# Patient Record
Sex: MALE | Race: WHITE | Employment: UNEMPLOYED | URBAN - METROPOLITAN AREA
[De-identification: names, ages, dates, MRNs, and addresses within clinical notes are randomized per-mention and may not be internally consistent; named-entity substitution may affect disease eponyms.]

---

## 2017-01-03 ENCOUNTER — OFFICE VISIT (OUTPATIENT)
Dept: FAMILY MEDICINE | Facility: CLINIC | Age: 47
End: 2017-01-03

## 2017-01-03 VITALS
BODY MASS INDEX: 30.1 KG/M2 | DIASTOLIC BLOOD PRESSURE: 84 MMHG | OXYGEN SATURATION: 98 % | HEART RATE: 95 BPM | WEIGHT: 215 LBS | TEMPERATURE: 97.9 F | RESPIRATION RATE: 12 BRPM | HEIGHT: 71 IN | SYSTOLIC BLOOD PRESSURE: 130 MMHG

## 2017-01-03 DIAGNOSIS — I10 ESSENTIAL HYPERTENSION, BENIGN: Primary | ICD-10-CM

## 2017-01-03 DIAGNOSIS — L84 CORN OR CALLUS: ICD-10-CM

## 2017-01-03 LAB
BUN SERPL-MCNC: 21 MG/DL (ref 5–24)
CALCIUM SERPL-MCNC: 9.7 MG/DL (ref 8.5–10.4)
CHLORIDE SERPLBLD-SCNC: 97 MMOL/L (ref 94–109)
CHOLEST SERPL-MCNC: 187 MG/DL
CHOLEST/HDLC SERPL: 4.5 RATIO
CO2 SERPL-SCNC: 29 MMOL/L (ref 20–32)
CREAT SERPL-MCNC: 0.9 MG/DL (ref 0.8–1.5)
EGFR CALCULATED (BLACK REFERENCE): 117.4 ML/MIN
EGFR CALCULATED (NON BLACK REFERENCE): 97 ML/MIN
GLUCOSE SERPL-MCNC: 87 MG/DL (ref 60–109)
HBA1C MFR BLD: 5.5 % (ref 4.1–5.7)
HDLC SERPL-MCNC: 41 MG/DL
LDLC SERPL CALC-MCNC: 98 MG/DL (ref 0–99)
POTASSIUM SERPL-SCNC: 4.3 MMOL/L (ref 3.4–5.3)
SODIUM SERPL-SCNC: 140 MMOL/L (ref 133–144)
TRIGL SERPL-MCNC: 241 MG/DL
VLDL-CHOLESTEROL: 48 MG/DL (ref 7–32)

## 2017-01-03 RX ORDER — HYDROCHLOROTHIAZIDE 25 MG/1
25 TABLET ORAL DAILY
Qty: 90 TABLET | Refills: 3 | Status: SHIPPED | OUTPATIENT
Start: 2017-01-03 | End: 2018-01-18

## 2017-01-03 RX ORDER — LISINOPRIL 5 MG/1
5 TABLET ORAL DAILY
Qty: 90 TABLET | Refills: 3 | Status: SHIPPED | OUTPATIENT
Start: 2017-01-03 | End: 2018-01-18

## 2017-01-03 NOTE — MR AVS SNAPSHOT
"              After Visit Summary   1/3/2017    Humberto Nova    MRN: [id number]           Patient Information     Date Of Birth          1970        Visit Information        Provider Department      1/3/2017 2:20 PM Daniel Sepulveda MD [facility]        Today's Diagnoses     Essential hypertension, benign    -  1     Corn or callus            Follow-ups after your visit        Who to contact     Please call your clinic at [phone] to:    Ask questions about your health    Make or cancel appointments    Discuss your medicines    Learn about your test results    Speak to your doctor   If you have compliments or concerns about an experience at your clinic, or if you wish to file a complaint, please contact [facility] Patient Relations at [phone] or email us at [email or web address]         Additional Information About Your Visit        MyChart Information     [employer] gives you secure access to your electronic health record. If you see a primary care provider, you can also send messages to your care team and make appointments. If you have questions, please call your primary care clinic.  If you do not have a primary care provider, please call [phone] and they will assist you.      My[employer] is an electronic gateway that provides easy, online access to your medical records. With [employer], you can request a clinic appointment, read your test results, renew a prescription or communicate with your care team.     To access your existing account, please contact your [facility] Clinic or call [phone] for assistance.        Care EveryWhere ID     This is your Care EveryWhere ID. This could be used by other organizations to access your [facility] medical records  [id number]        Your Vitals Were     Pulse Temperature Respirations Height BMI (Body Mass Index) Pulse Oximetry    95 97.9  F (36.6  C) (Oral) 12 5' 11.25\" (181 cm) " 29.77 kg/m2 98%       Blood Pressure from Last 3 Encounters:   01/03/17 130/84   11/25/14 111/70   09/29/14 135/86    Weight from Last 3 Encounters:   01/03/17 215 lb (97.523 kg)   11/25/14 214 lb 6.4 oz (97.251 kg)   09/29/14 209 lb 6.4 oz (94.983 kg)              We Performed the Following     Basic Metabolic Panel ([facility]) - Results < 1 hr     Hemoglobin A1c ([facility])     Lipid Panel ([facility]) - Results < 1 hr          Where to get your medicines      These medications were sent to [id number] - [place], [place] - [address] AT [facility] & [place] [address], [place] [address]     Phone:  [phone]    - hydrochlorothiazide 25 MG tablet  - lisinopril 5 MG tablet       Primary Care Provider Office Phone # Fax #    Daniel Sepulveda [phone]       [facility] [address] [facility] [address]        Thank you!     Thank you for choosing [facility]  for your care. Our goal is always to provide you with excellent care. Hearing back from our patients is one way we can continue to improve our services. Please take a few minutes to complete the written survey that you may receive in the mail after your visit with us. Thank you!             Your Updated Medication List - Protect others around you: Learn how to safely use, store and throw away your medicines at www.disposemymeds.org.          This list is accurate as of: 1/3/17  2:52 PM.  Always use your most recent med list.                   Brand Name Dispense Instructions for use    hydrochlorothiazide 25 MG tablet    HYDRODIURIL    90 tablet    Take 1 tablet (25 mg) by mouth daily       lisinopril 5 MG tablet    PRINIVIL/ZESTRIL    90 tablet    Take 1 tablet (5 mg) by mouth daily

## 2017-01-03 NOTE — PROGRESS NOTES
"       HPI:   Humberto Nova is a 46 year old male with PMH of HTN, remote drug abuse who presents with:    HTN f/u: He is on HCTZ and low dose Lisinoprol for several years. He has been stable on these meds for years and no longer checks his BP on a regular basis. He denies any side effects. Denies HA's, vision complaints, CP, SOB, LE edema, urinary problems. He has a strong family history of HTN with no MI's or CVA's.     Lenore: right lateral 5th digit with corn. It intermittently gets painful and is currently asymptomatic. He wears heavy work boots and this sometimes worsens his problem. No bleeding. No other toe or foot problems including bleeding or open sores.          Review of Systems:   10 point ROS negative except as mentioned above              PMHX:     Patient Active Problem List   Diagnosis     Benign essential hypertension     Health Care Home       Current Outpatient Prescriptions   Medication Sig Dispense Refill     hydrochlorothiazide (HYDRODIURIL) 25 MG tablet Take 1 tablet (25 mg) by mouth daily 90 tablet 3     lisinopril (PRINIVIL/ZESTRIL) 5 MG tablet Take 1 tablet (5 mg) by mouth daily 90 tablet 3       Social History   Substance Use Topics     Smoking status: Former Smoker -- 1.00 packs/day for 18 years     Types: Cigarettes     Smokeless tobacco: Not on file      Comment: roughly quit about 2007-     Alcohol Use: No      Comment: previous etoh abuse      SH: No drugs or alcohol for > 10 years.  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       No Known Allergies    Past medical, surgical, and family history reviewed and updated in Epic \"History\" tab and/or problem list.       Physical Exam:     Filed Vitals:    01/03/17 1425   BP: 130/84   Pulse: 95   Temp: 97.9  F (36.6  C)   TempSrc: Oral   Resp: 12   Height: 5' 11.25\" (181 cm)   Weight: 215 lb (97.523 kg)   SpO2: 98%     Body mass index is 29.77 kg/(m^2).  VS reviewed    GENERAL APPEARANCE: healthy, alert and no distress,  EYES: Eyes " grossly normal to inspection,  PERRL  NECK: no adenopathy, no asymmetry, masses, or scars and thyroid normal to palpation  RESP: lungs clear to auscultation - no rales, rhonchi or wheezes  CV: regular rate and rhythm,  and no murmur, click,  rub or gallop  ABDOMEN: soft, nontender, without hepatosplenomegaly or masses  MS: extremities normal- no gross deformities noted. Right lateral 5th toe with mild corn with no open wounds.   SKIN: no suspicious lesions or rashes  NEURO: Normal strength and tone, sensory exam grossly normal, mentation appears intact and speech normal  PSYCH: mood and affect normal/bright    Assessment and Plan     1. Essential hypertension, benign: BP well controlled on current regimen. We discussed possibly simplifying regimen to one pill, however, he would like to continue what is working. Lab eval below was unremarkable. Low ASCVD risk. Recommended weight loss, active lifestyle, and at least yearly f/u  - hydrochlorothiazide (HYDRODIURIL) 25 MG tablet; Take 1 tablet (25 mg) by mouth daily  Dispense: 90 tablet; Refill: 3  - lisinopril (PRINIVIL/ZESTRIL) 5 MG tablet; Take 1 tablet (5 mg) by mouth daily  Dispense: 90 tablet; Refill: 3  - Basic Metabolic Panel ([facility]) - Results < 1 hr  - Lipid Panel ([facility]) - Results < 1 hr  - Hemoglobin A1c ([facility])    2. Corn or callus: Stable and mild currently. We discussed shaving down the callous if he desired which he doesn't today. He will use a pumice stone to grind it down. He will f/u if it gets worse.       Options for treatment and follow-up care were reviewed with the patient and/or guardian. Humberto Nova and/or guardian engaged in the decision making process and verbalized understanding of the options discussed and agreed with the final plan.    Don Sepulveda MD  [facility] Medicine Resident  Pager # [phone]    Precepted with: Patrick Stevens MD

## 2017-01-10 NOTE — PROGRESS NOTES
Preceptor Attestation:  Patient's case reviewed and discussed with Daniel Sepulveda MD resident and I evaluated the patient. I agree with written assessment and plan of care.  Supervising Physician:  Patrick Stevens MD  [facility]

## 2018-01-18 DIAGNOSIS — I10 ESSENTIAL HYPERTENSION, BENIGN: ICD-10-CM

## 2018-01-18 RX ORDER — LISINOPRIL 5 MG/1
5 TABLET ORAL DAILY
Qty: 90 TABLET | Refills: 3 | Status: SHIPPED | OUTPATIENT
Start: 2018-01-18 | End: 2019-02-01

## 2018-01-18 RX ORDER — HYDROCHLOROTHIAZIDE 25 MG/1
25 TABLET ORAL DAILY
Qty: 90 TABLET | Refills: 3 | Status: SHIPPED | OUTPATIENT
Start: 2018-01-18 | End: 2019-02-01

## 2019-01-31 DIAGNOSIS — I10 ESSENTIAL HYPERTENSION, BENIGN: ICD-10-CM

## 2019-01-31 RX ORDER — LISINOPRIL 5 MG/1
5 TABLET ORAL DAILY
Qty: 90 TABLET | OUTPATIENT
Start: 2019-01-31

## 2019-01-31 RX ORDER — HYDROCHLOROTHIAZIDE 25 MG/1
25 TABLET ORAL DAILY
Qty: 90 TABLET | OUTPATIENT
Start: 2019-01-31

## 2019-02-01 ENCOUNTER — OFFICE VISIT (OUTPATIENT)
Dept: FAMILY MEDICINE | Facility: CLINIC | Age: 49
End: 2019-02-01
Payer: COMMERCIAL

## 2019-02-01 VITALS
WEIGHT: 215 LBS | DIASTOLIC BLOOD PRESSURE: 84 MMHG | HEIGHT: 72 IN | RESPIRATION RATE: 20 BRPM | HEART RATE: 86 BPM | BODY MASS INDEX: 29.12 KG/M2 | OXYGEN SATURATION: 98 % | SYSTOLIC BLOOD PRESSURE: 122 MMHG

## 2019-02-01 DIAGNOSIS — E66.3 OVERWEIGHT (BMI 25.0-29.9): ICD-10-CM

## 2019-02-01 DIAGNOSIS — I10 BENIGN ESSENTIAL HYPERTENSION: ICD-10-CM

## 2019-02-01 DIAGNOSIS — Z00.01 ENCOUNTER FOR ROUTINE ADULT MEDICAL EXAM WITH ABNORMAL FINDINGS: Primary | ICD-10-CM

## 2019-02-01 LAB
BUN SERPL-MCNC: 17 MG/DL (ref 5–24)
CALCIUM SERPL-MCNC: 10.2 MG/DL (ref 8.5–10.4)
CHLORIDE SERPLBLD-SCNC: 101 MMOL/L (ref 94–109)
CO2 SERPL-SCNC: 31 MMOL/L (ref 20–32)
CREAT SERPL-MCNC: 1 MG/DL (ref 0.8–1.5)
EGFR CALCULATED (BLACK REFERENCE): >90 ML/MIN
EGFR CALCULATED (NON BLACK REFERENCE): 85.1 ML/MIN
GLUCOSE SERPL-MCNC: 99 MG/DL (ref 60–109)
POTASSIUM SERPL-SCNC: 4.6 MMOL/L (ref 3.4–5.3)
SODIUM SERPL-SCNC: 147 MMOL/L (ref 133–144)

## 2019-02-01 RX ORDER — HYDROCHLOROTHIAZIDE 25 MG/1
25 TABLET ORAL DAILY
Qty: 90 TABLET | Refills: 3 | Status: SHIPPED | OUTPATIENT
Start: 2019-02-01 | End: 2020-02-21

## 2019-02-01 RX ORDER — LISINOPRIL 5 MG/1
5 TABLET ORAL DAILY
Qty: 90 TABLET | Refills: 3 | Status: SHIPPED | OUTPATIENT
Start: 2019-02-01 | End: 2020-02-21

## 2019-02-01 ASSESSMENT — MIFFLIN-ST. JEOR: SCORE: 1888.23

## 2019-02-01 NOTE — NURSING NOTE
Chief Complaint   Patient presents with     Physical     Refill Request     all meds     Medication Reconciliation     completed.        /84   Pulse 86   Resp 20   Ht 1.829 m (6')   Wt 97.5 kg (215 lb)   SpO2 98%   BMI 29.16 kg/m

## 2019-02-01 NOTE — PROGRESS NOTES
"Male Physical Note         HPI       Humberto Nova is a 48 year old  male. Concerns today include: medication refill, concerned after his wife's friend  of a heart attack and wants his heart \"checked out.\"         Review of Systems:   Constitutional:  No fever or chills. No fatigue. No change in appetite. No unintentional changes in weight.   HEENT: No acute changes or problems with hearing, vision, or smell. Sees his optometrist regularly, last seen never. No tooth pain, oral lesions, dry mouth, or difficulty swallowing. Sees his dentist regularly, last seen 2 years ago. No frequent nosebleeds. No recurrent colds.  Neuro: No headaches. No feelings of extremity weakness, numbness, burning, or pain. No vertigo. No difficulty with balance or gait . No syncope.  Psych: No changes or problems with falling or staying sleep. No changes in mood or affect, see PHQ-2. No feelings of significant anxiety.  Endocrine: No excessive thirst. No heat or cold intolerance.   Cardiovascular: No chest pain or palpitations. No dyspnea. No lightheadedness or syncopal episodes. No new/worsening peripheral edema. No intermittent leg cramps.  Respiratory:  No significant cough. No wheezing. No hemoptysis.  Muskuloskeletal: No myalgia or arthralgia. No feelings of restricted motion.  GI: No nausea, vomiting, constipation, or diarrhea. No heartburn. No bloody stools.   : No changes in urinary frequency. No nocturia, incontinence, dysuria, or hematuria.   Derm: No dryness or itching. No rashes. No changes in size or color of moles. No significant changes in hair growth/loss.  Heme/Lymph: No abnormal bleeding or bruising. No swollen glands.    Active Problems List  Patient Active Problem List   Diagnosis     Benign essential hypertension     Health Care Home     Overweight (BMI 25.0-29.9)     Active problem list reviewed and updated.    Past Medical History  Past Medical History:   Diagnosis Date     Benign essential hypertension  "     Past medical history reviewed and updated.     Past Surgical History  Past Surgical History:   Procedure Laterality Date     TONSILLECTOMY       Past surgical history reviewed and updated.    Current Medications    Current Outpatient Medications:      hydrochlorothiazide (HYDRODIURIL) 25 MG tablet, Take 1 tablet (25 mg) by mouth daily, Disp: 90 tablet, Rfl: 3     lisinopril (PRINIVIL/ZESTRIL) 5 MG tablet, Take 1 tablet (5 mg) by mouth daily, Disp: 90 tablet, Rfl: 3  Medication list reviewed and updated.    Family History  Family History     Problem (# of Occurrences) Relation (Name,Age of Onset)    Hypertension (2) Father, Maternal Grandfather       Negative family history of: Cancer - colorectal, Cerebrovascular Disease, Diabetes        Family history reviewed and updated.  Social History  - Lives in a house in [facility]  - Works as a labor ortiz    Social history reviewed and updated.    Allergies  No Known Allergies  Allergies and Medication Intolerances Updated    Risk Behaviors and Healthy Habits:  - Caffeine Consumption: Yes, coffee 2 cups daily  - Tobacco Use/Smoking: smoked until 2007  - Do you use alcohol? No, Patient stopped drinking alcohol 13 years ago  - Illicit Drug Use: Past daily meth, cocaine, and marijuana use. [id number].  - Exercise (30 min accumulated most days):Yes  - Do you feel you have at least 1 close friend, that is not an immediate family member, with whom you could talk with during difficult times? Yes    Immunization History  Immunization History   Administered Date(s) Administered     Influenza (IIV3) PF 01/18/2013     Tdap (Adacel,Boostrix) 02/21/2011            Physical Exam:   /84   Pulse 86   Resp 20   Ht 1.829 m (6')   Wt 97.5 kg (215 lb)   SpO2 98%   BMI 29.16 kg/m    General:  Appears healthy and in no acute distress  Psych: Alert and oriented to person, place, and time. Able to articulate logical thoughts. Mood is bright. Affect matches mood.  HEENT:  Eyes  grossly normal to inspection. Extraocular movements intact. Pupils equal, round, and reactive to light. Ear canals clear with pearly grey tympanic membranes without bulging or erythema. Mucous membranes moist. No ulcers or lesions noted in the oropharynx.  Heme/Lymph:  No cervical lymphadenopathy.  Endocrine: Thyroid palpated without enlargement or nodules.  Cardiovascular:  Regular rate and rhythm, normal S1 and S2 without murmur. No extra heartsounds or friction rub.  Respiratory: Lungs clear to auscultation bilaterally. No wheezing or crackles. No prolonged expiration. Good air movement throughout.  Neuro: Clear coherent speech.  Musculoskeletal: No gross extremity deformities. No peripheral edema  Derm: scaly/crusty lesions on face     Assessment and Plan    Humberto was seen today for physical, refill request and medication reconciliation.    Diagnoses and all orders for this visit:    Encounter for routine adult medical exam with abnormal findings: See plan below.    Benign essential hypertension: Check annual BMP today. Call with results. Refilled meds. Well controlled. No change in regiment at this time.  -     Basic Metabolic Panel ([facility]) - Results < 1 hr    Overweight (BMI 25.0-29.9): Counseled regarding weight loss. Cut out candy and chips. Does not drink pop. Weight has been stable over last 5 years.    Preventative Health:  History   Smoking Status     Former Smoker     Packs/day: 1.00     Years: 18.00     Types: Cigarettes   Smokeless Tobacco     Former User     Types: Chew     Comment: roughly quit about 2007-     HTN Screen:   BP Readings from Last 1 Encounters:   02/01/19 122/84     Cholesterol Level (>36 yo or at risk):  Date done 1/3/17  Result(s) below  Recent Labs   Lab Test 01/03/17  1511 08/28/14  1552   CHOL 187.0 183.0   HDL 41.0 47.0   LDL 98.0 112.0*   TRIG 241.0* 119.0   CHOLHDLRATIO 4.5 3.9     Diabetes Screen (>41 yo):   Recent Labs   Lab Test 01/03/17  1511 08/28/14  1552   A1C 5.5 5.1      Depression screening: PHQ-2 done today and negative.  Discussed  Influenza vaccination today. Patient refused    Patient Active Problem List   Diagnosis     Benign essential hypertension     Health Care Home     Overweight (BMI 25.0-29.9)       Follow up in one year, sooner if problems arise.    Kassie Foster, MS3, served as scribe for this encounter    I was present with the medical student who participated in the service and in the documentation of this note. I have verified the history and personally performed the physical exam and medical decision making, and have verified the content of the note, which accurately reflects my assessment of the patient and the plan of care.     Mauro Wilkins MD  [facility] Medicine Resident  Pager# [phone]    Precepted with: Danuta

## 2019-02-04 NOTE — PROGRESS NOTES
I have personally reviewed the history and examination as documented by Dr. Wilkins and Kassie Foster MS3.  I was present during key portions of the visit and agree with the assessment and plan as documented for 48 yr old male with HTN here for well visit. BP stable. Counseled on weight. Low-risk CV profile reviewed. Precautions given. Anticipatory guidance given.     Renny Tipton MD  February 4, 2019  10:08 AM

## 2019-11-08 ENCOUNTER — HEALTH MAINTENANCE LETTER (OUTPATIENT)
Age: 49
End: 2019-11-08

## 2020-01-29 DIAGNOSIS — I10 BENIGN ESSENTIAL HYPERTENSION: ICD-10-CM

## 2020-01-30 RX ORDER — HYDROCHLOROTHIAZIDE 25 MG/1
25 TABLET ORAL DAILY
Qty: 90 TABLET | OUTPATIENT
Start: 2020-01-30

## 2020-01-30 RX ORDER — LISINOPRIL 5 MG/1
5 TABLET ORAL DAILY
Qty: 90 TABLET | OUTPATIENT
Start: 2020-01-30

## 2020-01-30 NOTE — TELEPHONE ENCOUNTER
Patient has not been into clinic for almost 1 year.  Please schedule patient for follow up appointment in regards to HTN, and then will refill prescription.

## 2020-01-31 NOTE — TELEPHONE ENCOUNTER
Spoke to pt, pt state he understand he have not been in for awhile but he will call back and make an appointment once he get back in town.

## 2020-02-21 ENCOUNTER — OFFICE VISIT (OUTPATIENT)
Dept: FAMILY MEDICINE | Facility: CLINIC | Age: 50
End: 2020-02-21
Payer: COMMERCIAL

## 2020-02-21 VITALS
DIASTOLIC BLOOD PRESSURE: 84 MMHG | RESPIRATION RATE: 16 BRPM | SYSTOLIC BLOOD PRESSURE: 131 MMHG | BODY MASS INDEX: 26.99 KG/M2 | WEIGHT: 199 LBS | TEMPERATURE: 97.5 F | OXYGEN SATURATION: 100 % | HEART RATE: 73 BPM

## 2020-02-21 DIAGNOSIS — Z00.00 HEALTHCARE MAINTENANCE: ICD-10-CM

## 2020-02-21 DIAGNOSIS — E66.3 OVERWEIGHT (BMI 25.0-29.9): ICD-10-CM

## 2020-02-21 DIAGNOSIS — I10 BENIGN ESSENTIAL HYPERTENSION: Primary | ICD-10-CM

## 2020-02-21 LAB
BUN SERPL-MCNC: 17 MG/DL (ref 5–24)
CALCIUM SERPL-MCNC: 10 MG/DL (ref 8.5–10.4)
CHLORIDE SERPLBLD-SCNC: 100 MMOL/L (ref 94–109)
CHOLEST SERPL-MCNC: 171 MG/DL
CHOLEST/HDLC SERPL: 3.1 RATIO
CO2 SERPL-SCNC: 28 MMOL/L (ref 20–32)
CREAT SERPL-MCNC: 1 MG/DL (ref 0.8–1.5)
EGFR CALCULATED (BLACK REFERENCE): >90 ML/MIN
EGFR CALCULATED (NON BLACK REFERENCE): 84.8 ML/MIN
GLUCOSE SERPL-MCNC: 109 MG/DL (ref 60–109)
HBA1C MFR BLD: 5.1 % (ref 4.1–5.7)
HDLC SERPL-MCNC: 56 MG/DL
HIV 1+2 AB+HIV1 P24 AG SERPL QL IA: NEGATIVE
LDLC SERPL CALC-MCNC: 102 MG/DL (ref 0–99)
POTASSIUM SERPL-SCNC: 4.2 MMOL/L (ref 3.4–5.3)
SODIUM SERPL-SCNC: 142 MMOL/L (ref 133–144)
TRIGL SERPL-MCNC: 66 MG/DL
VLDL-CHOLESTEROL: 13 MG/DL (ref 7–32)

## 2020-02-21 RX ORDER — LISINOPRIL 5 MG/1
5 TABLET ORAL DAILY
Qty: 90 TABLET | Refills: 3 | Status: SHIPPED | OUTPATIENT
Start: 2020-02-21 | End: 2021-01-27

## 2020-02-21 RX ORDER — HYDROCHLOROTHIAZIDE 25 MG/1
25 TABLET ORAL DAILY
Qty: 90 TABLET | Refills: 3 | Status: SHIPPED | OUTPATIENT
Start: 2020-02-21 | End: 2021-01-27

## 2020-02-25 NOTE — PROGRESS NOTES
Preceptor Attestation:  Patient's case reviewed and discussed with Nara Stephen DO resident and I evaluated the patient. I agree with written assessment and plan of care.  Supervising Physician:  CATHERINE MODI MD  [facility]

## 2020-12-06 ENCOUNTER — HEALTH MAINTENANCE LETTER (OUTPATIENT)
Age: 50
End: 2020-12-06

## 2021-01-27 ENCOUNTER — OFFICE VISIT (OUTPATIENT)
Dept: FAMILY MEDICINE | Facility: CLINIC | Age: 51
End: 2021-01-27
Payer: COMMERCIAL

## 2021-01-27 VITALS
HEART RATE: 79 BPM | SYSTOLIC BLOOD PRESSURE: 133 MMHG | OXYGEN SATURATION: 99 % | BODY MASS INDEX: 29.02 KG/M2 | WEIGHT: 214 LBS | DIASTOLIC BLOOD PRESSURE: 74 MMHG | RESPIRATION RATE: 20 BRPM

## 2021-01-27 DIAGNOSIS — I10 BENIGN ESSENTIAL HYPERTENSION: ICD-10-CM

## 2021-01-27 DIAGNOSIS — G47.30 SLEEP APNEA, UNSPECIFIED TYPE: ICD-10-CM

## 2021-01-27 DIAGNOSIS — Z00.00 ROUTINE GENERAL MEDICAL EXAMINATION AT A HEALTH CARE FACILITY: Primary | ICD-10-CM

## 2021-01-27 LAB
BUN SERPL-MCNC: 16 MG/DL (ref 5–24)
CALCIUM SERPL-MCNC: 9.8 MG/DL (ref 8.5–10.4)
CHLORIDE SERPLBLD-SCNC: 104 MMOL/L (ref 94–109)
CHOLEST SERPL-MCNC: 185 MG/DL
CO2 SERPL-SCNC: 30 MMOL/L (ref 20–32)
CREAT SERPL-MCNC: 1.1 MG/DL (ref 0.8–1.5)
EGFR CALCULATED (BLACK REFERENCE): >90 ML/MIN
EGFR CALCULATED (NON BLACK REFERENCE): 75.6 ML/MIN
FASTING?: NO
GLUCOSE SERPL-MCNC: 93 MG/DL (ref 60–109)
HDLC SERPL-MCNC: 49 MG/DL
LDLC SERPL CALC-MCNC: 118 MG/DL
POTASSIUM SERPL-SCNC: 4.3 MMOL/L (ref 3.4–5.3)
SODIUM SERPL-SCNC: 142 MMOL/L (ref 133–144)
TRIGL SERPL-MCNC: 89 MG/DL

## 2021-01-27 PROCEDURE — 99396 PREV VISIT EST AGE 40-64: CPT | Mod: GC | Performed by: STUDENT IN AN ORGANIZED HEALTH CARE EDUCATION/TRAINING PROGRAM

## 2021-01-27 PROCEDURE — 36415 COLL VENOUS BLD VENIPUNCTURE: CPT | Performed by: STUDENT IN AN ORGANIZED HEALTH CARE EDUCATION/TRAINING PROGRAM

## 2021-01-27 PROCEDURE — 80048 BASIC METABOLIC PNL TOTAL CA: CPT | Performed by: STUDENT IN AN ORGANIZED HEALTH CARE EDUCATION/TRAINING PROGRAM

## 2021-01-27 RX ORDER — HYDROCHLOROTHIAZIDE 25 MG/1
25 TABLET ORAL DAILY
Qty: 90 TABLET | Refills: 3 | Status: SHIPPED | OUTPATIENT
Start: 2021-01-27 | End: 2021-01-29

## 2021-01-27 RX ORDER — LISINOPRIL 5 MG/1
5 TABLET ORAL DAILY
Qty: 90 TABLET | Refills: 3 | Status: SHIPPED | OUTPATIENT
Start: 2021-01-27 | End: 2021-01-29

## 2021-01-27 NOTE — LETTER
January 28, 2021      Humberto Nova  [address] [place] [address]        Dear ,    We are writing to inform you of your test results.    Your cholesterol panel and electrolytes all look great! Please call if you have any questions, otherwise routine follow up in 1 year.     Resulted Orders   Lipid [place] ([facility]) - Results > 1 hr   Result Value Ref Range    Cholesterol 185 <=199 mg/dL    Triglycerides 89 <=149 mg/dL    HDL Cholesterol 49 >=40 mg/dL    LDL Cholesterol Calculated 118 <=129 mg/dL    Fasting? No     Narrative    Test performed by:  [facility]  [address] [place] [address]   Basic Metabolic Panel ([facility]) - Results < 1 hr   Result Value Ref Range    Glucose 93.0 60.0 - 109.0 mg/dL    Urea Nitrogen 16.0 5.0 - 24.0 mg/dL    Creatinine 1.1 0.8 - 1.5 mg/dL    Sodium 142.0 133.0 - 144.0 mmol/L    Potassium 4.3 3.4 - 5.3 mmol/L    Chloride 104.0 94.0 - 109.0 mmol/L    Carbon Dioxide 30.0 20.0 - 32.0 mmol/L    Calcium 9.8 8.5 - 10.4 mg/dL    eGFR Calculated (Non Black Reference) 75.6 >60.0 mL/min    eGFR Calculated (Black Reference) >90 >60.0 mL/min       If you have any questions or concerns, please call the clinic at the number listed above.       Sincerely,      Dr. Estrella

## 2021-01-27 NOTE — PROGRESS NOTES
I have personally reviewed the history and examination as documented by Dr. Estrella.  I was present during key portions of the visit and agree with the assessment and plan as documented for 50 yr old male with HTN here for well visit. Screen concerning for sleep apnea. Will check sleep study. Precautions given. Age-appropriate anticipatory guidance given.     Renny Tipton MD  January 27, 2021  4:01 PM

## 2021-01-27 NOTE — TELEPHONE ENCOUNTER
Message to physician:     Date of last visit: 02/21/20    Date of next visit if scheduled none    Last Comprehensive Metabolic Panel:  Sodium   Date Value Ref Range Status   02/21/2020 142.0 133.0 - 144.0 mmol/L Final     Potassium   Date Value Ref Range Status   02/21/2020 4.2 3.4 - 5.3 mmol/L Final     Chloride   Date Value Ref Range Status   02/21/2020 100.0 94.0 - 109.0 mmol/L Final     Carbon Dioxide   Date Value Ref Range Status   02/21/2020 28.0 20.0 - 32.0 mmol/L Final     Glucose   Date Value Ref Range Status   02/21/2020 109.0 60.0 - 109.0 mg/dL Final     Urea Nitrogen   Date Value Ref Range Status   02/21/2020 17.0 5.0 - 24.0 mg/dL Final     Creatinine   Date Value Ref Range Status   02/21/2020 1.0 0.8 - 1.5 mg/dL Final     GFR Estimate   Date Value Ref Range Status   11/25/2014 >60 >60 mL/min/1.73m2 Final     Calcium   Date Value Ref Range Status   02/21/2020 10.0 8.5 - 10.4 mg/dL Final       BP Readings from Last 3 Encounters:   02/21/20 131/84   02/01/19 122/84   01/03/17 130/84       Lab Results   Component Value Date    A1C 5.1 02/21/2020    A1C 5.5 01/03/2017    A1C 5.1 08/28/2014    A1C 5.6 04/30/2013                Please complete refill and CLOSE ENCOUNTER.  Closing the encounter signifies the refill is complete.

## 2021-01-27 NOTE — PROGRESS NOTES
Male Physical Note      Concerns today: No special concerns today.    Wife does note increased snoring, would like to discuss this further. STOP-BANG score reveals high risk for  Sleep apnea.  Does not endorse feeling tired, states he does wake up feeling well rested.      ROS:                      CONSTITUTIONAL: no fatigue, no unexpected change in weight  SKIN: no worrisome rashes, no worrisome moles, no worrisome lesions  EYES: no acute vision problems or changes  ENT: no ear problems, no mouth problems, no throat problems  RESP: no significant cough, no shortness of breath  CV: no chest pain, no palpitations, no new or worsening peripheral edema  GI: no nausea, no vomiting, no constipation, no diarrhea    Past Medical History:   Diagnosis Date     Benign essential hypertension         Family History   Problem Relation Age of Onset     Hypertension Father      Hypertension Maternal Grandfather      Cancer - colorectal No family hx of      Cerebrovascular Disease No family hx of      Diabetes No family hx of         Family History and past Medical History reviewed and unchanged/updated.    Social History     Tobacco Use     Smoking status: Former Smoker     Packs/day: 1.00     Years: 18.00     Pack years: 18.00     Types: Cigarettes     Smokeless tobacco: Former User     Types: Chew     Tobacco comment: roughly quit about 2007-   Substance Use Topics     Alcohol use: No     Alcohol/week: 0.0 standard drinks     Comment: previous etoh abuse quit~ 2005       Children ? no    Demolition and construction for work.    Has anyone hurt you physically, for example by pushing, hitting, slapping or kicking you or forcing you to have sex? Denies  Do you feel threatened or controlled by a partner, ex-partner or anyone in your life? Denies    RISK BEHAVIORS AND HEALTHY HABITS:  Tobacco Use/Smoking: None, former  Illicit Drug Use: None, former  ETOH: None, former  Do you use alcohol? No, former  Sexually Active:  Yes  Diet (5-7 servings of fruits/veg daily): No   Exercise (30 min accumulated most days):Yes  Dental Care: Is due for a follow up appointment.  Calcium 1500 mg/d:  Yes  Seat Belt Use: Yes     Cholesterol Level (>44 yo or at risk):  Recommended and patient accepted testing. and HIV screening:  Testing not indicated   Prostate screening discussed and patient informed that risks of screening may exceed benefits.    CV Risk based on Pooled Cohort Risk: 3.6%      Immunization History   Administered Date(s) Administered     Influenza (IIV3) PF 01/18/2013     Tdap (Adacel,Boostrix) 02/21/2011    Reviewed Immunization Record Today    EXAMINATION:  /74   Pulse 79   Resp 20   Wt 97.1 kg (214 lb)   SpO2 99%   BMI 29.02 kg/m    GENERAL: healthy, alert and no distress  EYES: Eyes grossly normal to inspection, extraocular movements - intact, and PERRL  HENT: ear canals- normal; TMs- normal; Nose- normal; Mouth- no ulcers, no lesions  NECK: no tenderness, no adenopathy, no asymmetry, no masses, no stiffness; thyroid- normal to palpation  RESP: lungs clear to auscultation - no rales, no rhonchi, no wheezes  CV: regular rates and rhythm, normal S1 S2, no S3 or S4 and no murmur, no click or rub -  ABDOMEN: soft, no tenderness, no  hepatosplenomegaly, no masses, normal bowel sounds  MS: extremities- no gross deformities noted, no edema  SKIN: no suspicious lesions, no rashes  NEURO: strength and tone- normal, sensory exam- grossly normal, mentation- intact, speech- normal, reflexes- symmetric  BACK: no CVA tenderness, no paralumbar tenderness  PSYCH: Alert and oriented times 3; speech- coherent , normal rate and volume; able to articulate logical thoughts, able to abstract reason, no tangential thoughts, no hallucinations or delusions, affect- normal  LYMPHATICS: ant. cervical- normal, post. cervical- normal, axillary- normal, supraclavicular- normal, inguinal- normal    ASSESSMENT:  1. Health Care Maintenance: Normal  Physical Exam    2. Snoring, concern for possible ELIOT  Sleep Study   STOP-BANG: high risk ELIOT    PLAN:  1.Lipid panel and BMP ordered.  and Routine follow up in one year.  2. Sleep study referral for possible ELIOT.    Precepted with Dr. Danuta Estrella MD  [facility] [place] [facility]

## 2021-01-29 RX ORDER — HYDROCHLOROTHIAZIDE 25 MG/1
25 TABLET ORAL DAILY
Qty: 90 TABLET | Refills: 3 | Status: SHIPPED | OUTPATIENT
Start: 2021-01-29 | End: 2022-01-20

## 2021-01-29 RX ORDER — LISINOPRIL 5 MG/1
5 TABLET ORAL DAILY
Qty: 90 TABLET | Refills: 3 | Status: SHIPPED | OUTPATIENT
Start: 2021-01-29 | End: 2022-01-20

## 2021-01-29 SDOH — ECONOMIC STABILITY: TRANSPORTATION INSECURITY
IN THE PAST 12 MONTHS, HAS THE LACK OF TRANSPORTATION KEPT YOU FROM MEDICAL APPOINTMENTS OR FROM GETTING MEDICATIONS?: NOT ASKED

## 2021-01-29 SDOH — ECONOMIC STABILITY: FOOD INSECURITY: WITHIN THE PAST 12 MONTHS, YOU WORRIED THAT YOUR FOOD WOULD RUN OUT BEFORE YOU GOT THE MONEY TO BUY MORE.: NOT ASKED

## 2021-01-29 SDOH — ECONOMIC STABILITY: FOOD INSECURITY: WITHIN THE PAST 12 MONTHS, THE FOOD YOU BOUGHT JUST DIDN'T LAST AND YOU DIDN'T HAVE MONEY TO GET MORE.: NOT ASKED

## 2021-01-29 SDOH — ECONOMIC STABILITY: INCOME INSECURITY: HOW HARD IS IT FOR YOU TO PAY FOR THE VERY BASICS LIKE FOOD, HOUSING, MEDICAL CARE, AND HEATING?: NOT ASKED

## 2021-01-29 SDOH — ECONOMIC STABILITY: TRANSPORTATION INSECURITY
IN THE PAST 12 MONTHS, HAS LACK OF TRANSPORTATION KEPT YOU FROM MEETINGS, WORK, OR FROM GETTING THINGS NEEDED FOR DAILY LIVING?: NOT ASKED

## 2021-01-29 SDOH — ECONOMIC STABILITY: TRANSPORTATION INSECURITY
IN THE PAST 12 MONTHS, HAS LACK OF TRANSPORTATION KEPT YOU FROM MEDICAL APPOINTMENTS OR FROM GETTING MEDICATIONS?: NOT ASKED

## 2021-01-29 SDOH — ECONOMIC STABILITY: FOOD INSECURITY: WITHIN THE PAST 12 MONTHS, YOU WORRIED THAT YOUR FOOD WOULD RUN OUT BEFORE YOU GOT MONEY TO BUY MORE.: NOT ASKED

## 2021-01-29 SDOH — ECONOMIC STABILITY: FOOD INSECURITY: HOW HARD IS IT FOR YOU TO PAY FOR THE VERY BASICS LIKE FOOD, HOUSING, MEDICAL CARE, AND HEATING?: NOT ASKED

## 2021-01-29 ASSESSMENT — ACTIVITIES OF DAILY LIVING (ADL): LACK_OF_TRANSPORTATION: NOT ASKED

## 2021-02-02 NOTE — PROGRESS NOTES
Humberto is a 50 year old who is being evaluated via a billable telephone visit.      What phone number would you like to be contacted at? [phone]  How would you like to obtain your AVS? Mail a copy   Lindy Capellan MA    Patient gave consent for phone visit.         Phone call duration: 21 minutes    Sleep Consultation:    Date on this visit: 2/3/2021    Humberto Nova is sent by Dorina Estrella for a sleep consultation regarding possible sleep apnea.    Primary Physician: Félix Estrada     Humberto Nova reports nightly snoring for more than 10 years.     Medical history is significant for hypertension & history of alcohol dependence, in remission, sober .     Humberto does snore every night. Patient does have a regular bed partner. There is report of snoring and gasping.  He does have witnessed apneas. They frequently sleep separately.  Patient sleeps on his back and side. He denies no morning headaches and restless legs. Humberto denies any bruxism, sleep walking, sleep talking, dream enactment, sleep paralysis, cataplexy and hypnogogic/hypnopompic hallucinations.    Humberto goes to sleep at 9:00 PM during the week. He wakes up at 4:45 AM with an alarm. He falls asleep in 5 minutes.  Humberto denies difficulty falling asleep.  He wakes up 0-1 times a night for 5 minutes before falling back to sleep.  Humberto wakes up to go to the bathroom and uncertain reasons.  On weekends, Humberto goes to sleep at 9:00 PM.  He wakes up at 4:45 AM with an alarm. He falls asleep in 5 minutes.  Patient gets an average of 7-8 hours of sleep per night.     Humberto denies difficulty breathing through his nose.      Patient's [place] Sleepiness score 1/24 consistent with no daytime sleepiness.      Humberto naps 0- 1 times per week for 10-20 minutes, feels refreshed after naps. He takes no inadvertant naps.  He denies closing eyes, dozing and falling asleep while driving. Patient was counseled on the importance of driving while alert, to  pull over if drowsy, or nap before getting into the vehicle if sleepy.      He uses 2-3 cups/day of coffee. Last caffeine intake is usually before 4 pm.    Allergies:    No Known Allergies    Medications:    Current Outpatient Medications   Medication Sig Dispense Refill     hydrochlorothiazide (HYDRODIURIL) 25 MG tablet Take 1 tablet (25 mg) by mouth daily 90 tablet 3     lisinopril (ZESTRIL) 5 MG tablet Take 1 tablet (5 mg) by mouth daily 90 tablet 3       Problem List:  Patient Active Problem List    Diagnosis Date Noted     Overweight (BMI 25.0-29.9) 02/01/2019     Priority: Medium     Benign essential hypertension 01/02/2013     Priority: Medium        Past Medical/Surgical History:  Past Medical History:   Diagnosis Date     Benign essential hypertension      Past Surgical History:   Procedure Laterality Date     TONSILLECTOMY         Social History:  Social History     Socioeconomic History     Marital status:      Spouse name: Not on file     Number of children: Not on file     Years of education: Not on file     Highest education level: 12th grade   Occupational History     Occupation: construction   Social Needs     Financial resource strain: Not on file     Food insecurity     Worry: Not on file     Inability: Not on file     Transportation needs     Medical: Not on file     Non-medical: Not on file   Tobacco Use     Smoking status: Former Smoker     Packs/day: 1.00     Years: 18.00     Pack years: 18.00     Types: Cigarettes     Smokeless tobacco: Former User     Types: Chew     Tobacco comment: roughly quit about 2007-   Substance and Sexual Activity     Alcohol use: No     Alcohol/week: 0.0 standard drinks     Comment: previous etoh abuse quit~ 2005     Drug use: No     Comment: previous abuse of meth ~ 2005     Sexual activity: Yes     Partners: Female   Lifestyle     Physical activity     Days per week: Not on file     Minutes per session: Not on file     Stress: Not on file   Relationships      Social connections     Talks on phone: Not on file     Gets together: Not on file     Attends Christian service: Not on file     Active member of club or organization: Not on file     Attends meetings of clubs or organizations: Not on file     Relationship status: Not on file     Intimate partner violence     Fear of current or ex partner: Not on file     Emotionally abused: Not on file     Physically abused: Not on file     Forced sexual activity: Not on file   Other Topics Concern     Not on file   Social History Narrative     Not on file       Family History:  Family History   Problem Relation Age of Onset     Hypertension Father      Hypertension Maternal Grandfather      Cancer - colorectal No family hx of      Cerebrovascular Disease No family hx of      Diabetes No family hx of      Grandfather snored loudly.     Review of Systems:  A complete review of systems reviewed by me is negative with the exeption of what has been mentioned in the history of present illness.  CONSTITUTIONAL: NEGATIVE for weight gain/loss, fever, chills, sweats or night sweats, drug allergies.  EYES: NEGATIVE for changes in vision, blind spots, double vision.  ENT: NEGATIVE for ear pain, sore throat, sinus pain, post-nasal drip, runny nose, bloody nose  CARDIAC: NEGATIVE for fast heartbeats or fluttering in chest, chest pain or pressure, breathlessness when lying flat, swollen legs or swollen feet.  NEUROLOGIC: NEGATIVE headaches, weakness or numbness in the arms or legs.  DERMATOLOGIC: NEGATIVE for rashes, new moles or change in mole(s)  PULMONARY: NEGATIVE SOB at rest, SOB with activity, dry cough, productive cough, coughing up blood, wheezing or whistling when breathing.    GASTROINTESTINAL: NEGATIVE for nausea or vomitting, loose or watery stools, fat or grease in stools, constipation, abdominal pain, bowel movements black in color or blood noted.  GENITOURINARY: NEGATIVE for pain during urination, blood in urine, urinating  more frequently than usual, irregular menstrual periods.  MUSCULOSKELETAL: NEGATIVE for muscle pain, bone or joint pain, swollen joints.  ENDOCRINE: NEGATIVE for increased thirst or urination, diabetes.  LYMPHATIC: NEGATIVE for swollen lymph nodes, lumps or bumps in the breasts or nipple discharge.    Screenings:  Vitals: There were no vitals taken for this visit.  BMI= There is no height or weight on file to calculate BMI.         [place] Total Score 2/3/2021   Total score - [place] 1       [facility] Total Score: 7 (02/03/21 4354)    Impression/Plan:    1. Possible Obstructive sleep apnea  2. Hypertension     Patient is a 50 years old male, with comorbid hypertension, BMI of 26, who presents with loud snoring and witnessed apneas. There is an intermediate to high risk for sleep apnea and an overnight sleep study is recommended for assessment and treatment planning.     Plan:     1. Home sleep apnea testing     Literature provided regarding sleep apnea.      He will follow up with me in approximately two weeks after his sleep study has been competed to review the results and discuss plan of care.       Polysomnography & HST reviewed.  Limitations of HST reviewed.   Obstructive sleep apnea reviewed.  Complications of untreated sleep apnea were reviewed.    I spent a total of 30 minutes for this appointment today which include time spent before, during and after the visit for patient care, counseling and coordination of care.    Dr. Momo Nguyen   CC: Dorina Estrella

## 2021-02-03 ENCOUNTER — VIRTUAL VISIT (OUTPATIENT)
Dept: SLEEP MEDICINE | Facility: CLINIC | Age: 51
End: 2021-02-03
Payer: COMMERCIAL

## 2021-02-03 DIAGNOSIS — R06.83 SNORING: ICD-10-CM

## 2021-02-03 DIAGNOSIS — I10 ESSENTIAL HYPERTENSION: ICD-10-CM

## 2021-02-03 DIAGNOSIS — R06.81 WITNESSED APNEIC SPELLS: ICD-10-CM

## 2021-02-03 DIAGNOSIS — R29.818 SUSPECTED SLEEP APNEA: Primary | ICD-10-CM

## 2021-02-03 PROCEDURE — 99203 OFFICE O/P NEW LOW 30 MIN: CPT | Mod: 95 | Performed by: INTERNAL MEDICINE

## 2021-02-03 NOTE — PATIENT INSTRUCTIONS
"MY TREATMENT INFORMATION FOR SLEEP APNEA-  Humberto Nova    DOCTOR : Momo Nguyen MD, MD    Am I having a sleep study at a sleep center?  --->Due to insurance clearance delays, you will be contacted within 2-4 weeks to schedule    Am I having a home sleep study?  --->Watch the video for the device you are using:    /drop off device-   https://www.[employer].com/watch?v=yGGFBdELGhk    Disposable device sent out require phone/computer application-   https://www.[employer].com/watch?v=BCce_vbiwxE      Frequently asked questions:  1. What is Obstructive Sleep Apnea (ELIOT)? ELIOT is the most common type of sleep apnea. Apnea means, \"without breath.\"  Apnea is most often caused by narrowing or collapse of the upper airway as muscles relax during sleep.   Almost everyone has occasional apneas. Most people with sleep apnea have had brief interruptions at night frequently for many years.  The severity of sleep apnea is related to how frequent and severe the events are.   2. What are the consequences of ELIOT? Symptoms include: feeling sleepy during the day, snoring loudly, gasping or stopping of breathing, trouble sleeping, and occasionally morning headaches or heartburn at night.  Sleepiness can be serious and even increase the risk of falling asleep while driving. Other health consequences may include development of high blood pressure and other cardiovascular disease in persons who are susceptible. Untreated ELIOT  can contribute to heart disease, stroke and diabetes.   3. What are the treatment options? In most situations, sleep apnea is a lifelong disease that must be managed with daily therapy. Medications are not effective for sleep apnea and surgery is generally not considered until other therapies have been tried. Your treatment is your choice . Continuous Positive Airway (CPAP) works right away and is the therapy that is effective in nearly everyone. An oral device to hold your jaw forward is usually the next most " reliable option. Other options include postioning devices (to keep you off your back), weight loss, and surgery including a tongue pacing device. There is more detail about some of these options below.  4. Are my sleep studies covered by insurance? Although we will request verification of coverage, we advise you also check in advance of the study to ensure there is coverage.    Important tips for those choosing CPAP and similar devices   Know your equipment:  CPAP is continuous positive airway pressure that prevents obstructive sleep apnea by keeping the throat from collapsing while you are sleeping. In most cases, the device is  smart  and can slowly self-adjusts if your throat collapses and keeps a record every day of how well you are treated-this information is available to you and your care team.  BPAP is bilevel positive airway pressure that keeps your throat open and also assists each breath with a pressure boost to maintain adequate breathing.  Special kinds of BPAP are used in patients who have inadequate breathing from lung or heart disease. In most cases, the device is  smart  and can slowly self-adjusts to assist breathing. Like CPAP, the device keeps a record of how well you are treated.  Your mask is your connection to the device. You get to choose what feels most comfortable and the staff will help to make sure if fits. Here: are some examples of the different masks that are available:       Key points to remember on your journey with sleep apnea:  1. Sleep study.  PAP devices often need to be adjusted during a sleep study to show that they are effective and adjusted right.  2. Good tips to remember: Try wearing just the mask during a quiet time during the day so your body adapts to wearing it. A humidifier is recommended for comfort in most cases to prevent drying of your nose and throat. Allergy medication from your provider may help you if you are having nasal congestion.  3. Getting settled-in. It  takes more than one night for most of us to get used to wearing a mask. Try wearing just the mask during a quiet time during the day so your body adapts to wearing it. A humidifier is recommended for comfort in most cases. Our team will work with you carefully on the first day and will be in contact within 4 days and again at 2 and 4 weeks for advice and remote device adjustments. Your therapy is evaluated by the device each day.   4. Use it every night. The more you are able to sleep naturally for 7-8 hours, the more likely you will have good sleep and to prevent health risks or symptoms from sleep apnea. Even if you use it 4 hours it helps. Occasionally all of us are unable to use a medical therapy, in sleep apnea, it is not dangerous to miss one night.   5. Communicate. Call our skilled team on the number provided on the first day if your visit for problems that make it difficult to wear the device. Over 2 out of 3 patients can learn to wear the device long-term with help from our team. Remember to call our team or your sleep providers if you are unable to wear the device as we may have other solutions for those who cannot adapt to mask CPAP therapy. It is recommended that you sleep your sleep provider within the first 3 months and yearly after that if you are not having problems.   6. Use it for your health. We encourage use of CPAP masks during daytime quiet periods to allow your face and brain to adapt to the sensation of CPAP so that it will be a more natural sensation to awaken to at night or during naps. This can be very useful during the first few weeks or months of adapting to CPAP though it does not help medically to wear CPAP during wakefulness and  should not be used as a strategy just to meet guidelines.  7. Take care of your equipment. Make sure you clean your mask and tubing using directions every day and that your filter and mask are replaced as recommended or if they are not working.     BESIDES  CPAP, WHAT OTHER THERAPIES ARE THERE?    Positioning Device  Positioning devices are generally used when sleep apnea is mild and only occurs on your back.This example shows a pillow that straps around the waist. It may be appropriate for those whose sleep study shows milder sleep apnea that occurs primarily when lying flat on one's back. Preliminary studies have shown benefit but effectiveness at home may need to be verified by a home sleep test. These devices are generally not covered by medical insurance.  Examples of devices that maintain sleeping on the back to prevent snoring and mild sleep apnea.    Belt type body positioner  http://TalkBin.[employer]obifusion/    Electronic reminder  http://nightshifttherapy.com/  http://[email or web address].[employer]ob[employer]usion.au/      Oral Appliance  What is oral appliance therapy?  An oral appliance device fits on your teeth at night like a retainer used after having braces. The device is made by a specialized dentist and requires several visits over 1-2 months before a manufactured device is made to fit your teeth and is adjusted to prevent your sleep apnea. Once an oral device is working properly, snoring should be improved. A home sleep test may be recommended at that time if to determine whether the sleep apnea is adequately treated.       Some things to remember:  -Oral devices are often, but not always, covered by your medical insurance. Be sure to check with your insurance provider.   -If you are referred for oral therapy, you will be given a list of specialized dentists to consider or you may choose to visit the Web site of the American Academy of Dental Sleep Medicine  -Oral devices are less likely to work if you have severe sleep apnea or are extremely overweight.     More detailed information  An oral appliance is a small acrylic device that fits over the upper and lower teeth  (similar to a retainer or a mouth guard). This device slightly moves jaw forward, which moves the base of the tongue forward,  opens the airway, improves breathing for effective treat snoring and obstructive sleep apnea in perhaps 7 out of 10 people .  The best working devices are custom-made by a dental device  after a mold is made of the teeth 1, 2, 3.  When is an oral appliance indicated?  Oral appliance therapy is recommended as a first-line treatment for patients with primary snoring, mild sleep apnea, and for patients with moderate sleep apnea who prefer appliance therapy to use of CPAP4, 5. Severity of sleep apnea is determined by sleep testing and is based on the number of respiratory events per hour of sleep.   How successful is oral appliance therapy?  The success rate of oral appliance therapy in patients with mild sleep apnea is 75-80% while in patients with moderate sleep apnea it is 50-70%. The chance of success in patients with severe sleep apnea is 40-50%. The research also shows that oral appliances have a beneficial effect on the cardiovascular health of ELIOT patients at the same magnitude as CPAP therapy7.  Oral appliances should be a second-line treatment in cases of severe sleep apnea, but if not completely successful then a combination therapy utilizing CPAP plus oral appliance therapy may be effective. Oral appliances tend to be effective in a broad range of patients although studies show that the patients who have the highest success are females, younger patients, those with milder disease, and less severe obesity. 3, 6.   Finding a dentist that practices dental sleep medicine  Specific training is available through the American Academy of Dental Sleep Medicine for dentists interested in working in the field of sleep. To find a dentist who is educated in the field of sleep and the use of oral appliances, near you, visit the Web site of the American Academy of Dental Sleep Medicine.    References  1. Boston et al. Objectively measured vs self-reported compliance during oral appliance therapy for  sleep-disordered breathing. Chest 2013; 144(5): 5295-3550.  2. Mini, et al. Objective measurement of compliance during oral appliance therapy for sleep-disordered breathing. Thorax 2013; 68(1): 91-96.  3. Nestor et al. Mandibular advancement devices in 620 men and women with ELIOT and snoring: tolerability and predictors of treatment success. Chest 2004; 125: 2783-4569.  4. Jonny et al. Oral appliances for snoring and ELIOT: a review. Sleep 2006; 29: 244-262.  5. Ting et al. Oral appliance treatment for ELIOT: an update. J Clin Sleep Med 2014; 10(2): 215-227.  6. Rylan et al. Predictors of OSAH treatment outcome. J Dent Res 2007; 86: 7222-2703.      Weight Loss:    Weight loss is a long-term strategy that may improve sleep apnea in some patients.    Weight management is a personal decision and the decision should be based on your interest and the potential benefits.  If you are interested in exploring weight loss strategies, the following discussion covers the impact on weight loss on sleep apnea and the approaches that may be successful.    Being overweight does not necessarily mean you will have health consequences.  Those who have BMI over 35 or over 27 with existing medical conditions carries greater risk.   Weight loss decreases severity of sleep apnea in most people with obesity. For those with mild obesity who have developed snoring with weight gain, even 15-30 pound weight loss can improve and occasionally eliminate sleep apnea.  Structured and life-long dietary and health habits are necessary to lose weight and keep healthier weight levels.     Though there may be significant health benefits from weight loss, long-term weight loss is very difficult to achieve- studies show success with dietary management in less than 10% of people. In addition, substantial weight loss may require years of dietary control and may be difficult if patients have severe obesity. In these cases, surgical  management may be considered.  Finally, older individuals who have tolerated obesity without health complications may be less likely to benefit from weight loss strategies.        Surgery:    Surgery for obstructive sleep apnea is considered generally only when other therapies fail to work. Surgery may be discussed with you if you are having a difficult time tolerating CPAP and or when there is an abnormal structure that requires surgical correction.  Nose and throat surgeries often enlarge the airway to prevent collapse.  Most of these surgeries create pain for 1-2 weeks and up to half of the most common surgeries are not effective throughout life.  You should carefully discuss the benefits and drawbacks to surgery with your sleep provider and surgeon to determine if it is the best solution for you.   More information  Surgery for ELIOT is directed at areas that are responsible for narrowing or complete obstruction of the airway during sleep.  There are a wide range of procedures available to enlarge and/or stabilize the airway to prevent blockage of breathing in the three major areas where it can occur: the palate, tongue, and nasal regions.  Successful surgical treatment depends on the accurate identification of the factors responsible for obstructive sleep apnea in each person.  A personalized approach is required because there is no single treatment that works well for everyone.  Because of anatomic variation, consultation with an examination by a sleep surgeon is a critical first step in determining what surgical options are best for each patient.  In some cases, examination during sedation may be recommended in order to guide the selection of procedures.  Patients will be counseled about risks and benefits as well as the typical recovery course after surgery. Surgery is typically not a cure for a person s ELIOT.  However, surgery will often significantly improve one s ELIOT severity (termed  success rate ).  Even  in the absence of a cure, surgery will decrease the cardiovascular risk associated with OSA7; improve overall quality of life8 (sleepiness, functionality, sleep quality, etc).      Palate Procedures:  Patients with ELIOT often have narrowing of their airway in the region of their tonsils and uvula.  The goals of palate procedures are to widen the airway in this region as well as to help the tissues resist collapse.  Modern palate procedure techniques focus on tissue conservation and soft tissue rearrangement, rather than tissue removal.  Often the uvula is preserved in this procedure. Residual sleep apnea is common in patient after pharyngoplasty with an average reduction in sleep apnea events of 33%2.      Tongue Procedures:  ExamWhile patients are awake, the muscles that surround the throat are active and keep this region open for breathing. These muscles relax during sleep, allowing the tongue and other structures to collapse and block breathing.  There are several different tongue procedures available.  Selection of a tongue base procedure depends on characteristics seen on physical exam.  Generally, procedures are aimed at removing bulky tissues in this area or preventing the back of the tongue from falling back during sleep.  Success rates for tongue surgery range from 50-62%3.    Hypoglossal Nerve Stimulation:  Hypoglossal nerve stimulation has recently received approval from the [place] Food and Drug Administration for the treatment of obstructive sleep apnea.  This is based on research showing that the system was safe and effective in treating sleep apnea6.  Results showed that the median AHI score decreased 68%, from 29.3 to 9.0. This therapy uses an implant system that senses breathing patterns and delivers mild stimulation to airway muscles, which keeps the airway open during sleep.  The system consists of three fully implanted components: a small generator (similar in size to a pacemaker), a  breathing sensor, and a stimulation lead.  Using a small handheld remote, a patient turns the therapy on before bed and off upon awakening.    Candidates for this device must be greater than 22 years of age, have moderate to severe ELIOT (AHI between 20-65), BMI less than 32, have tried CPAP/oral appliance without success, and have appropriate upper airway anatomy (determined by a sleep endoscopy performed by Dr. Bal).    Hypoglossal Nerve Stimulation Pathway:    The sleep surgeon s office will work with the patient through the insurance prior-authorization process (including communications and appeals).    Nasal Procedures:  Nasal obstruction can interfere with nasal breathing during the day and night.  Studies have shown that relief of nasal obstruction can improve the ability of some patients to tolerate positive airway pressure therapy for obstructive sleep apnea1.  Treatment options include medications such as nasal saline, topical corticosteroid and antihistamine sprays, and oral medications such as antihistamines or decongestants. Non-surgical treatments can include external nasal dilators for selected patients. If these are not successful by themselves, surgery can improve the nasal airway either alone or in combination with these other options.      Combination Procedures:  Combination of surgical procedures and other treatments may be recommended, particularly if patients have more than one area of narrowing or persistent positional disease.  The success rate of combination surgery ranges from 66-80%2,3.    References  1. Stephanie DURAN. The Role of the Nose in Snoring and Obstructive Sleep Apnoea: An Update.  Eur Arch Otorhinolaryngol. 2011; 268: 1365-73.  2.  Sofiya SM; Mariam JA; Sondra JR; Pallanch JF; Roni MB; Phillip SG; Migue HAUSER. Surgical modifications of the upper airway for obstructive sleep apnea in adults: a systematic review and meta-analysis. SLEEP 2010;33(10):7984-2775. Jose MARTINI  Hypopharyngeal surgery in obstructive sleep apnea: an evidence-based medicine review.  Arch Otolaryngol Head Neck Surg. 2006 Feb;132(2):206-13.  3. Daniel YH1, Pati Y, Jonah HENRI. The efficacy of anatomically based multilevel surgery for obstructive sleep apnea. Otolaryngol Head Neck Surg. 2003 Oct;129(4):327-35.  4. Jose E, Goldberg A. Hypopharyngeal Surgery in Obstructive Sleep Apnea: An Evidence-Based Medicine Review. Arch Otolaryngol Head Neck Surg. 2006 Feb;132(2):206-13.  5. Taqueria PJ et al. Upper-Airway Stimulation for Obstructive Sleep Apnea.  N Engl J Med. 2014 Jan 9;370(2):139-49.  6. Sailaja Y et al. Increased Incidence of Cardiovascular Disease in Middle-aged Men with Obstructive Sleep Apnea. Am J Respir Crit Care Med; 2002 166: 159-165  7. Mendoza EM et al. Studying Life Effects and Effectiveness of Palatopharyngoplasty (SLEEP) study: Subjective Outcomes of Isolated Uvulopalatopharyngoplasty. Otolaryngol Head Neck Surg. 2011; 144: 623-631.

## 2021-03-17 ENCOUNTER — OFFICE VISIT (OUTPATIENT)
Dept: SLEEP MEDICINE | Facility: CLINIC | Age: 51
End: 2021-03-17
Attending: INTERNAL MEDICINE
Payer: COMMERCIAL

## 2021-03-17 DIAGNOSIS — R06.83 SNORING: ICD-10-CM

## 2021-03-17 DIAGNOSIS — I10 ESSENTIAL HYPERTENSION: ICD-10-CM

## 2021-03-17 DIAGNOSIS — R29.818 SUSPECTED SLEEP APNEA: ICD-10-CM

## 2021-03-17 DIAGNOSIS — R06.81 WITNESSED APNEIC SPELLS: ICD-10-CM

## 2021-03-17 DIAGNOSIS — G47.33 OSA (OBSTRUCTIVE SLEEP APNEA): ICD-10-CM

## 2021-03-17 PROCEDURE — G0399 HOME SLEEP TEST/TYPE 3 PORTA: HCPCS | Performed by: INTERNAL MEDICINE

## 2021-03-18 ENCOUNTER — DOCUMENTATION ONLY (OUTPATIENT)
Dept: SLEEP MEDICINE | Facility: CLINIC | Age: 51
End: 2021-03-18
Payer: COMMERCIAL

## 2021-03-18 DIAGNOSIS — G47.33 OSA (OBSTRUCTIVE SLEEP APNEA): ICD-10-CM

## 2021-03-18 NOTE — PROGRESS NOTES
HST POST-STUDY QUESTIONNAIRE    1. What time did you go to bed?  9  2. How long do you think it took to fall asleep?  15-30 min  3. What time did you wake up to start the day?  0430  4. Did you get up during the night at all?  Yes  5. If you woke up, do you remember approximately what time(s)? 0230  6. Did you have any difficulty with the equipment?  No  7. Did you us any type of treatment with this study?  None  8. Was the head of the bed elevated? No  9. Did you sleep in a recliner?  No  10. Did you stop using CPAP at least 3 days before this test?  NA  11. Any other information you'd like us to know? None    This HSAT was performed using a Noxturnal T3 device which recorded snore, sound, movement activity, body position, nasal pressure, oronasal thermal airflow, pulse, oximetry and both chest and abdominal respiratory effort. HSAT data was restricted to the time patient states they were in bed.     HSAT was scored using 1B 4% hypopnea rule.     HST AHI (Non-PAT): 27.3  Snoring was reported as loud.  Time with SpO2 below 89% was 43 minutes.   Overall signal quality was good     Pt will follow up with sleep provider to determine appropriate therapy.

## 2021-03-19 NOTE — PROCEDURES
HOME SLEEP STUDY INTERPRETATION    Patient: Humberto Nova  MRN: [id number]  YOB: 1970  Study Date: 3/17/2021  Referring Provider: Félix Estrada;   Ordering Provider: Momo Nguyen MD, MD     Indications for Home Study: Humberto Nova is a 50 year old male with a history of HTN who presents with symptoms suggestive of obstructive sleep apnea.    Estimated body mass index is 29.02 kg/m  as calculated from the following:    Height as of 19: 1.829 m (6').    Weight as of 21: 97.1 kg (214 lb).  Total score - [place]: 1 (2/3/2021  9:19 AM)  STOP-BAN/8    Data: A full night home sleep study was performed recording the standard physiologic parameters including body position, movement, sound, nasal pressure, thermal oral airflow, chest and abdominal movements with respiratory inductance plethysmography, and oxygen saturation by pulse oximetry. Pulse rate was estimated by oximetry recording. This study was considered adequate based on > 4 hours of quality oximetry and respiratory recording. As specified by the AASM Manual for the Scoring of Sleep and Associated events, version 2.3, Rule VIII.D 1B, 4% oxygen desaturation scoring for hypopneas is used as a standard of care on all home sleep apnea testing.    Analysis Time:  439 minutes    Respiration:   Sleep Associated Hypoxemia: sustained hypoxemia was present. Baseline oxygen saturation was 95%.  Time with saturation less than or equal to 88% was 34 minutes. The lowest oxygen saturation was 79%.   Snoring: Snoring was present.  Respiratory events: The home study revealed a presence of 169 obstructive apneas and 24 mixed and central apneas. There were 7 hypopneas resulting in a combined apnea/hypopnea index [AHI] of 27.3 events per hour.  AHI was 57 per hour supine, - per hour prone, 26.9 per hour on left side, and 0.4 per hour on right side.   Pattern: Excluding events noted above, respiratory rate and pattern was Normal.    Position:  Percent of time spent: supine - 31.4%, prone - 0%, on left - 34.5%, on right - 34%.    Heart Rate: By pulse oximetry normal rate was noted.     Assessment:   Moderate obstructive sleep apnea.  Sleep disordered breathing events and oxygen desaturations were exacerbated in supine sleep. Clustering of events suggest sleep stage dependent disease.   Sleep associated hypoxemia was present.    Recommendations:    CPAP therapy is the treatment of choice for moderate obstructive sleep apnea. Consider auto-CPAP at 5-15 cmH2O.  If CPAP is not tolerated or accepted, consider secondary alternatives of oral appliance therapy or surgical evaluation.   Suggest optimizing sleep hygiene and avoiding sleep deprivation.  Weight management.    Diagnosis Code(s): Obstructive Sleep Apnea G47.33, Hypoxemia G47.36    Momo Nguyen MD, March 18, 2021   Diplomate, American Board of Psychiatry and Neurology, Sleep Medicine

## 2021-04-05 NOTE — PROGRESS NOTES
"Humberto Nova is a 50 year old male being evaluated via a billable telephone visit.     \"This telephone visit will be conducted via a call between you and your physician/provider. We have found that certain health care needs can be provided without the need for an in-person visit or physical exam.  This service lets us provide the care you need with a telephone conversation.  If a prescription is necessary we can send it directly to your pharmacy.  If lab work is needed we can place an order for that and you can then stop by our lab to have the test done at a later time.\"    Telephone visits are billed at different rates depending on your insurance coverage.  Please reach out to your insurance provider with any questions.    Patient has given verbal consent for  a Telephone visit? Yes    What telephone number would you like your provider to contact at at:  [phone]    How would you like to obtain your AVS? Mail a copy    Lindy Timi    Telephone Visit Details:     Telephone Visit Start Time: 4:18 PM    Telephone Visit End Time:4:29 PM      Sleep Study Follow-Up Visit:    Date on this visit: 4/6/2021    Humberto Nova comes in today for follow-up of his home sleep study done on 3/17/21 at the [facility] for possible sleep apnea.    Analysis Time:  439 minutes     Respiration:   Sleep Associated Hypoxemia: sustained hypoxemia was present. Baseline oxygen saturation was 95%.  Time with saturation less than or equal to 88% was 34 minutes. The lowest oxygen saturation was 79%.   Snoring: Snoring was present.  Respiratory events: The home study revealed a presence of 169 obstructive apneas and 24 mixed and central apneas. There were 7 hypopneas resulting in a combined apnea/hypopnea index [AHI] of 27.3 events per hour.  AHI was 57 per hour supine, - per hour prone, 26.9 per hour on left side, and 0.4 per hour on right side.   Pattern: Excluding events noted above, respiratory rate and pattern was " Normal.     Position: Percent of time spent: supine - 31.4%, prone - 0%, on left - 34.5%, on right - 34%.     Heart Rate: By pulse oximetry normal rate was noted.    Assessment:   Moderate obstructive sleep apnea.  Sleep disordered breathing events and oxygen desaturations were exacerbated in supine sleep. Clustering of events suggest sleep stage dependent disease.   Sleep associated hypoxemia was present.  These findings were reviewed with patient.     Past medical/surgical history, family history, social history, medications and allergies were reviewed.      Problem List:  Patient Active Problem List    Diagnosis Date Noted     ELIOT (obstructive sleep apnea) 03/18/2021     Priority: Medium     Overweight (BMI 25.0-29.9) 02/01/2019     Priority: Medium     Benign essential hypertension 01/02/2013     Priority: Medium        Impression/Plan:    1. Obstructive sleep apnea    HST result was reviewed in detail. Patient was counseled regarding moderate sleep apnea, consequences of untreated disease and management options. Patient opts for CPAP therapy.     Plan:     1. Start auto PAP therapy       He will follow up with me in about 2 month(s).     Eleven minutes spent with patient, all of which were spent counseling, consulting, coordinating plan of care.      Dr. Momo Nguyen     CC: Félix Estrada

## 2021-04-06 ENCOUNTER — VIRTUAL VISIT (OUTPATIENT)
Dept: SLEEP MEDICINE | Facility: CLINIC | Age: 51
End: 2021-04-06
Payer: COMMERCIAL

## 2021-04-06 DIAGNOSIS — G47.33 OSA (OBSTRUCTIVE SLEEP APNEA): Primary | ICD-10-CM

## 2021-04-06 PROCEDURE — 99213 OFFICE O/P EST LOW 20 MIN: CPT | Mod: 95 | Performed by: INTERNAL MEDICINE

## 2021-04-21 ENCOUNTER — DOCUMENTATION ONLY (OUTPATIENT)
Dept: SLEEP MEDICINE | Facility: CLINIC | Age: 51
End: 2021-04-21

## 2021-04-21 DIAGNOSIS — G47.33 OSA (OBSTRUCTIVE SLEEP APNEA): ICD-10-CM

## 2021-04-21 NOTE — PROGRESS NOTES
Patient was offered choice of vendor and chose [facility].  Patient Humberto Nova was set up at [facility]  on April 21, 2021. Patient received a Resmed AirSense 10 Auto. Pressures were set at 5-15 cm H2O.   Patient s ramp is 5 cm H2O for Off and FLEX/EPR is EPR, 2.  Patient received a Jac[employer] Mask name: Dreamwisp  Nasal mask, heated tubing and heated humidifier.  Patient does not need to meet compliance. Patient has a follow up on TBD with Dr. Linnea Munguia Her

## 2021-04-26 ENCOUNTER — DOCUMENTATION ONLY (OUTPATIENT)
Dept: SLEEP MEDICINE | Facility: CLINIC | Age: 51
End: 2021-04-26
Payer: COMMERCIAL

## 2021-04-26 DIAGNOSIS — G47.33 OSA (OBSTRUCTIVE SLEEP APNEA): ICD-10-CM

## 2021-04-26 NOTE — PROGRESS NOTES
3 day Sleep therapy management telephone visit    Diagnostic AHI:  27.3 HST    Confirmed with patient at time of call- Yes Patient is still interested in STM service       Subjective measures:  Patient hasn't started using CPAP he will start using tonight.    Replacement device: No  STM ordered by provider: Yes    Objective data     Order Settings for PAP  CPAP min 5    CPAP max 15          Assessment: No usage but has account in [employer]       Action plan: Patient to have 14 day STM visit. Patient has a follow up visit scheduled:   no.      Total time spent on accessing and  interpreting remote patient PAP therapy data  10 minutes    Total time spent counseling, coaching  and reviewing PAP therapy data with patient  3 minutes    [id number] no

## 2021-05-05 ENCOUNTER — DOCUMENTATION ONLY (OUTPATIENT)
Dept: SLEEP MEDICINE | Facility: CLINIC | Age: 51
End: 2021-05-05

## 2021-05-05 DIAGNOSIS — G47.33 OSA (OBSTRUCTIVE SLEEP APNEA): ICD-10-CM

## 2021-05-05 NOTE — PROGRESS NOTES
14  DAY STM VISIT    Diagnostic AHI:  27.3 HST    Message left for patient to return call     Assessment: Pt not meeting objective benchmarks for compliance.     Action plan: waiting for patient to return call.       Device type: Auto-CPAP    PAP settings: CPAP min 5.0 cm  H20       CPAP max 15.0 cm  H20      95th% pressure 8.8 cm  H20        RESMED EPR level Setting: TWO    RESMED Soft response setting:  OFF    Mask type:  Nasal Mask    Objective measures: 14 day rolling measures      Compliance  14 %      Leak  27.3  lpm  last  upload      AHI 6.78   last  upload      Average number of minutes 115      Objective measure goal  Compliance   Goal >70%  Leak   Goal < 24 lpm  AHI  Goal < 5  Usage  Goal >240        Total time spent on accessing and interpreting remote patient PAP therapy data  10 minutes    Total time spent counseling, coaching  and reviewing PAP therapy data with patient  0 minutes    [id number]  [id number]  no (3 day STM)

## 2021-05-24 ENCOUNTER — DOCUMENTATION ONLY (OUTPATIENT)
Dept: SLEEP MEDICINE | Facility: CLINIC | Age: 51
End: 2021-05-24

## 2021-05-24 DIAGNOSIS — G47.33 OSA (OBSTRUCTIVE SLEEP APNEA): ICD-10-CM

## 2021-05-24 NOTE — PROGRESS NOTES
30 DAY STM VISIT    Diagnostic AHI:  27.3 HST    Subjective measures:   Patient said things are going better. He takes mask off after a few hours because he is worried about waking up.     Assessment: Pt not meeting objective benchmarks for compliance.   Action plan: pt to have 6 month STM visit  Patient has not scheduled a follow up visit.   Device type: Auto-CPAP  PAP settings: CPAP min 5.0 cm  H20     CPAP max 15.0 cm  H20    95th% pressure 9 cm  H20      RESMED EPR level Setting: TWO    RESMED Soft response setting:  OFF  Mask type:  Nasal Mask  Objective measures: 14 day rolling measures      Compliance  50 %      Leak  16.89 lpm  last  upload      AHI 2.21   last  upload      Average number of minutes 258      Objective measure goal  Compliance   Goal >70%  Leak   Goal < 24 lpm  AHI  Goal < 5  Usage  Goal >240        Total time spent on accessing and interpreting remote patient PAP therapy data  2 minutes    Total time spent counseling, coaching  and reviewing PAP therapy data with patient  5 minutes     [id number] this call  [id number] no  at 3 or 14 day [facility]

## 2021-05-31 ENCOUNTER — RECORDS - HEALTHEAST (OUTPATIENT)
Dept: ADMINISTRATIVE | Facility: CLINIC | Age: 51
End: 2021-05-31

## 2021-09-25 ENCOUNTER — HEALTH MAINTENANCE LETTER (OUTPATIENT)
Age: 51
End: 2021-09-25

## 2021-10-22 ENCOUNTER — OFFICE VISIT (OUTPATIENT)
Dept: FAMILY MEDICINE | Facility: CLINIC | Age: 51
End: 2021-10-22
Payer: COMMERCIAL

## 2021-10-22 VITALS
SYSTOLIC BLOOD PRESSURE: 137 MMHG | TEMPERATURE: 97.8 F | WEIGHT: 205 LBS | HEART RATE: 82 BPM | DIASTOLIC BLOOD PRESSURE: 82 MMHG | OXYGEN SATURATION: 99 % | RESPIRATION RATE: 16 BRPM | HEIGHT: 72 IN | BODY MASS INDEX: 27.77 KG/M2

## 2021-10-22 DIAGNOSIS — Z23 NEED FOR PROPHYLACTIC VACCINATION AND INOCULATION AGAINST INFLUENZA: ICD-10-CM

## 2021-10-22 DIAGNOSIS — E66.3 OVERWEIGHT (BMI 25.0-29.9): ICD-10-CM

## 2021-10-22 DIAGNOSIS — G47.33 OSA (OBSTRUCTIVE SLEEP APNEA): ICD-10-CM

## 2021-10-22 DIAGNOSIS — I10 BENIGN ESSENTIAL HYPERTENSION: ICD-10-CM

## 2021-10-22 DIAGNOSIS — Z01.818 PREOP GENERAL PHYSICAL EXAM: Primary | ICD-10-CM

## 2021-10-22 PROCEDURE — 93000 ELECTROCARDIOGRAM COMPLETE: CPT | Performed by: STUDENT IN AN ORGANIZED HEALTH CARE EDUCATION/TRAINING PROGRAM

## 2021-10-22 PROCEDURE — 99214 OFFICE O/P EST MOD 30 MIN: CPT | Mod: 25 | Performed by: STUDENT IN AN ORGANIZED HEALTH CARE EDUCATION/TRAINING PROGRAM

## 2021-10-22 PROCEDURE — 90471 IMMUNIZATION ADMIN: CPT | Performed by: STUDENT IN AN ORGANIZED HEALTH CARE EDUCATION/TRAINING PROGRAM

## 2021-10-22 PROCEDURE — 90682 RIV4 VACC RECOMBINANT DNA IM: CPT | Performed by: STUDENT IN AN ORGANIZED HEALTH CARE EDUCATION/TRAINING PROGRAM

## 2021-10-22 ASSESSMENT — MIFFLIN-ST. JEOR: SCORE: 1827.87

## 2021-10-22 NOTE — PROGRESS NOTES
[facility] [address] [place] [address]  Phone: [phone]  Fax: [phone]  Primary Provider: Brice Neal  Pre-op Performing Provider: MONTSERRAT MONTE      PREOPERATIVE EVALUATION:  Today's date: 10/22/2021    Humberto Nova is a 51 year old male who presents for a preoperative evaluation.    Surgical Information:  Surgery/Procedure: Root canal, crowns, bridge  Surgery Location: [facility]  Surgeon: Dr. Carter,   Surgery Date: 11/19/21  Time of Surgery: TBD  Where patient plans to recover: At home with family  Fax number for surgical facility: Fax number to be provided.    Type of Anesthesia Anticipated: Local with MAC    Assessment & Plan     The proposed surgical procedure is considered LOW risk.      (Z01.818) Preop general physical exam  (primary encounter diagnosis)  Plan: No contraindication to dental procedure.   EKG performed per request. WNL.     (G47.33) ELIOT (obstructive sleep apnea)  Comment: Uses CPAP, started using it this year  Plan: Continue CPAP use    (I10) Benign essential hypertension  Comment: On lisinopril and HCTZ  Plan: Continue current medication regimen, should hold hydrochlorothiazide morning of procedure    (E66.3) Overweight (BMI 25.0-29.9)  Plan: Healthy diet and exercise recommended.    (Z23) Need for prophylactic vaccination and inoculation against influenza  Plan: INFLUENZA QUAD, RECOMBINANT, P-FREE (RIV4)         (FLUBLOK)         Risks and Recommendations:  The patient has the following additional risks and recommendations for perioperative complications:   - No identified additional risk factors other than previously addressed      RECOMMENDATION:  APPROVAL GIVEN to proceed with proposed procedure, without further diagnostic evaluation.    Should hold hydrochlorothiazide on day of procedure.          Subjective     HPI related to upcoming procedure:   Having multiple dental procedures done, to include  fillings, crowns/bridges, root canal. Has had poor follow up with dentist in the past. Will have these procedures done under MAC.     Preop Questions 10/22/2021   1. Have you ever had a heart attack or stroke? No   2. Have you ever had surgery on your heart or blood vessels, such as a stent placement, a coronary artery bypass, or surgery on an artery in your head, neck, heart, or legs? No   3. Do you have chest pain with activity? No   4. Do you have a history of  heart failure? No   5. Do you currently have a cold, bronchitis or symptoms of other infection? No   6. Do you have a cough, shortness of breath, or wheezing? No   7. Do you or anyone in your family have previous history of blood clots? YES - Grandfather had blood clots in lungs, and legs.    8. Do you or does anyone in your family have a serious bleeding problem such as prolonged bleeding following surgeries or cuts? No   9. Have you ever had problems with anemia or been told to take iron pills? No   10. Have you had any abnormal blood loss such as black, tarry or bloody stools? No   11. Have you ever had a blood transfusion? No   12. Are you willing to have a blood transfusion if it is medically needed before, during, or after your surgery? Yes   13. Have you or any of your relatives ever had problems with anesthesia? No   14. Do you have sleep apnea, excessive snoring or daytime drowsiness? YES - Has CPAP, does not use it every night. Jsut got CPAP this year.    14a. Do you have a CPAP machine? Yes   15. Do you have any artifical heart valves or other implanted medical devices like a pacemaker, defibrillator, or continuous glucose monitor? No   16. Do you have artificial joints? No   17. Are you allergic to latex? No       Health Care Directive:  Patient does not have a Health Care Directive or Living Will: Discussed advance care planning with patient; however, patient declined at this time.    Preoperative Review of :   reviewed - no record of  controlled substances prescribed.        Review of Systems  Constitutional, neuro, ENT, endocrine, pulmonary, cardiac, gastrointestinal, genitourinary, musculoskeletal, integument and psychiatric systems are negative, except as otherwise noted.    Patient Active Problem List    Diagnosis Date Noted     ELIOT (obstructive sleep apnea) 03/18/2021     Priority: Medium     Overweight (BMI 25.0-29.9) 02/01/2019     Priority: Medium     Benign essential hypertension 01/02/2013     Priority: Medium      Past Medical History:   Diagnosis Date     Benign essential hypertension      Past Surgical History:   Procedure Laterality Date     TONSILLECTOMY       Current Outpatient Medications   Medication Sig Dispense Refill     hydrochlorothiazide (HYDRODIURIL) 25 MG tablet Take 1 tablet (25 mg) by mouth daily 90 tablet 3     lisinopril (ZESTRIL) 5 MG tablet Take 1 tablet (5 mg) by mouth daily 90 tablet 3       No Known Allergies     Social History     Tobacco Use     Smoking status: Former Smoker     Packs/day: 1.00     Years: 18.00     Pack years: 18.00     Types: Cigarettes     Smokeless tobacco: Former User     Types: Chew     Tobacco comment: roughly quit about 2007-   Substance Use Topics     Alcohol use: No     Alcohol/week: 0.0 standard drinks     Comment: previous etoh abuse quit~ 2005     History   Drug Use No     Comment: previous abuse of meth ~ 2005         Objective     /82   Pulse 82   Temp 97.8  F (36.6  C) (Oral)   Resp 16   Ht 1.829 m (6')   Wt 93 kg (205 lb)   SpO2 99%   BMI 27.80 kg/m      Physical Exam    GENERAL APPEARANCE: healthy, alert and no distress     EYES: EOMI,  PERRL     HENT: ear canals and TM's normal and nose and mouth without ulcers or lesions     NECK: no adenopathy, no asymmetry, masses, or scars and thyroid normal to palpation     RESP: lungs clear to auscultation - no rales, rhonchi or wheezes     CV: regular rates and rhythm, normal S1 S2, no S3 or S4 and no murmur, click or  rub     ABDOMEN:  soft, nontender, no HSM or masses and bowel sounds normal     MS: extremities normal- no gross deformities noted, no evidence of inflammation in joints, FROM in all extremities.     SKIN: no suspicious lesions or rashes     NEURO: Normal strength and tone, sensory exam grossly normal, mentation intact and speech normal     PSYCH: mentation appears normal. and affect normal/bright     LYMPHATICS: No cervical adenopathy    Recent Labs   Lab Test 01/27/21  1621 02/21/20  0822   .0 142.0   POTASSIUM 4.3 4.2   CR 1.1 1.0   A1C  --  5.1        Diagnostics:  No labs were ordered during this visit.   EKG acquired per request.  EKG: appears normal, NSR, Normal Sinus Rhythm, normal axis, normal intervals, no acute ST/T changes c/w ischemia, no LVH by voltage criteria, unchanged from previous tracings    Revised Cardiac Risk Index (RCRI):  The patient has the following serious cardiovascular risks for perioperative complications:   - No serious cardiac risks = 0 points     RCRI Interpretation: 0 points: Class I (very low risk - 0.4% complication rate)           Signed Electronically by: Maykel Hart DO  Copy of this evaluation report is provided to requesting physician.    Precepted with Dr. Sun Barrow MD

## 2021-10-22 NOTE — PROGRESS NOTES
Preceptor Attestation:  Patient's case reviewed and discussed with the resident, Maykel Hart DO, and I personally evaluated the patient. I agree with written assessment and plan of care. Preoperative clearance approved. Blood pressures well-controlled today and the patient is asymptomatic with no other known cardiovascular history.    Supervising Physician:  Sun Barrow MD   [facility]

## 2021-10-22 NOTE — PATIENT INSTRUCTIONS
- Hold the hydrochlorothiazide on the morning of the surgery.  Preparing for Your Surgery  Getting started  A nurse will call you to review your health history and instructions. They will give you an arrival time based on your scheduled surgery time.  Please be ready to share the following:    Your doctor's clinic name and phone number    Your medical, surgical and anesthesia history    A list of allergies and sensitivities    A list of medicines, including herbal treatments and over-the-counter drugs    Whether the patient has a legal guardian (ask how to send us the papers in advance)  If you have a child who's having surgery, please ask for a copy of Preparing for Your Child's Surgery.    Preparing for surgery    Within 30 days of surgery: Have a pre-op exam (sometimes called an H&P, or History and Physical). This can be done at a clinic or pre-operative center.  ? If you're having a , you may not need this exam. Talk to your care team    At your pre-op exam, talk to your care team about all medicines you take. If you need to stop any medicines before surgery, ask when to start taking them again.  ? We do this for your safety. Many medicines can make you bleed too much during surgery. Some change how well surgery (anesthesia) drugs work.    Call your insurance company to let them know you're having surgery. (If you don't have insurance, call [phone].)    Call your clinic if there's any change in your health. This includes signs of a cold or flu (sore throat, runny nose, cough, rash, fever). It also includes a scrape or scratch near the surgery site.    If you have questions on the day of surgery, call your hospital or surgery center.  Eating and drinking guidelines  For your safety: Unless your surgeon tells you otherwise, follow the guidelines below.    Eat and drink as usual until 8 hours before surgery. After that, no food or milk.    Drink clear liquids until 2 hours before surgery. These are  liquids you can see through, like water, Gatorade and Propel Water. You may also have black coffee and tea (no cream or milk).    Nothing by mouth within 2 hours of surgery. This includes gum, candy and breath mints.    If you drink, stop drinking alcohol the night before surgery.    If your care team tells you to take medicine on the morning of surgery, it's okay to take it with a sip of water.  Preventing infection    Shower or bathe the night before and morning of your surgery. Follow the instructions your clinic gave you. (If no instructions, use regular soap.)    Don't shave or clip hair near your surgery site. We'll remove the hair if needed.    Don't smoke or vape the morning of surgery. You may chew nicotine gum up to 2 hours before surgery. A nicotine patch is okay.  ? Note: Some surgeries require you to completely quit smoking and nicotine. Check with your surgeon.    Your care team will make every effort to keep you safe from infection. We will:  ? Clean our hands often with soap and water (or an alcohol-based hand rub).  ? Clean the skin at your surgery site with a special soap that kills germs.  ? Give you a special gown to keep you warm. (Cold raises the risk of infection.)  ? Wear special hair covers, masks, gowns and gloves during surgery.  ? Give antibiotic medicine, if prescribed. Not all surgeries need antibiotics.  What to bring on the day of surgery    Photo ID and insurance card    Copy of your health care directive, if you have one    Glasses and hearing aides (bring cases)  ? You can't wear contacts during surgery    Inhaler and eye drops, if you use them (tell us about these when you arrive)    CPAP machine or breathing device, if you use them    A few personal items, if spending the night    If you have . . .  ? A pacemaker or ICD (cardiac defibrillator): Bring the ID card.  ? An implanted stimulator: Bring the remote control.  ? A legal guardian: Bring a copy of the certified  (court-stamped) guardianship papers.  Please remove any jewelry, including body piercings. Leave jewelry and other valuables at home.  If you're going home the day of surgery  Important: If you don't follow the rules below, we must cancel your surgery.     Arrange for someone to drive you home after surgery. You may not drive, take a taxi or take public transportation by yourself (unless you'll have local anesthesia only).    Arrange for a responsible adult to stay with you overnight. If you don't, we may keep you in the hospital overnight, and you may need to pay the costs yourself.  Questions?   If you have any questions for your care team, list them here: _________________________________________________________________________________________________________________________________________________________________________________________________________________________________________________________________________________________________________________________  For informational purposes only. Not to replace the advice of your health care provider. Copyright   2003, 2019 [employer]. All rights reserved. Clinically reviewed by Heather Luque MD. [employer] 180151 - REV 4/20.      Pre-op faxed to fax number :  [phone]  Location :  [facility]  Date of Surgery :  11/19/2021  By/Date :  Juliette 10/28/2021

## 2022-01-20 ENCOUNTER — OFFICE VISIT (OUTPATIENT)
Dept: FAMILY MEDICINE | Facility: CLINIC | Age: 52
End: 2022-01-20
Payer: COMMERCIAL

## 2022-01-20 VITALS
RESPIRATION RATE: 20 BRPM | HEART RATE: 91 BPM | BODY MASS INDEX: 27.5 KG/M2 | DIASTOLIC BLOOD PRESSURE: 84 MMHG | SYSTOLIC BLOOD PRESSURE: 133 MMHG | WEIGHT: 203 LBS | OXYGEN SATURATION: 98 % | HEIGHT: 72 IN | TEMPERATURE: 98.1 F

## 2022-01-20 DIAGNOSIS — G47.33 OSA (OBSTRUCTIVE SLEEP APNEA): ICD-10-CM

## 2022-01-20 DIAGNOSIS — Z01.818 PREOP GENERAL PHYSICAL EXAM: Primary | ICD-10-CM

## 2022-01-20 DIAGNOSIS — I10 BENIGN ESSENTIAL HYPERTENSION: ICD-10-CM

## 2022-01-20 LAB
ANION GAP SERPL CALCULATED.3IONS-SCNC: 13 MMOL/L (ref 5–18)
BUN SERPL-MCNC: 17 MG/DL (ref 8–22)
CALCIUM SERPL-MCNC: 10.7 MG/DL (ref 8.5–10.5)
CHLORIDE BLD-SCNC: 99 MMOL/L (ref 98–107)
CO2 SERPL-SCNC: 25 MMOL/L (ref 22–31)
CREAT SERPL-MCNC: 1.02 MG/DL (ref 0.7–1.3)
GFR SERPL CREATININE-BSD FRML MDRD: 90 ML/MIN/1.73M2
GLUCOSE BLD-MCNC: 94 MG/DL (ref 70–125)
POTASSIUM BLD-SCNC: 4.4 MMOL/L (ref 3.5–5)
SODIUM SERPL-SCNC: 137 MMOL/L (ref 136–145)

## 2022-01-20 PROCEDURE — 80048 BASIC METABOLIC PNL TOTAL CA: CPT

## 2022-01-20 PROCEDURE — 99214 OFFICE O/P EST MOD 30 MIN: CPT | Mod: GC

## 2022-01-20 PROCEDURE — 36415 COLL VENOUS BLD VENIPUNCTURE: CPT

## 2022-01-20 RX ORDER — HYDROCHLOROTHIAZIDE 25 MG/1
25 TABLET ORAL DAILY
Qty: 90 TABLET | Refills: 3 | Status: SHIPPED | OUTPATIENT
Start: 2022-01-20

## 2022-01-20 RX ORDER — LISINOPRIL 5 MG/1
5 TABLET ORAL DAILY
Qty: 90 TABLET | Refills: 3 | Status: SHIPPED | OUTPATIENT
Start: 2022-01-20

## 2022-01-20 ASSESSMENT — MIFFLIN-ST. JEOR: SCORE: 1818.8

## 2022-01-20 NOTE — PROGRESS NOTES
Preceptor Attestation:   Patient seen, evaluated and discussed with the resident. I have verified the content of the note, which accurately reflects my assessment of the patient and the plan of care.    Supervising Physician:Shirley Costa MD    [facility]

## 2022-01-20 NOTE — PATIENT INSTRUCTIONS
Preparing for Your Surgery  Getting started  A nurse will call you to review your health history and instructions. They will give you an arrival time based on your scheduled surgery time. Please be ready to share:    Your doctor's clinic name and phone number    Your medical, surgical and anesthesia history    A list of allergies and sensitivities    A list of medicines, including herbal treatments and over-the-counter drugs    Whether the patient has a legal guardian (ask how to send us the papers in advance)  Please tell us if you're pregnant--or if there's any chance you might be pregnant. Some surgeries may injure a fetus (unborn baby), so they require a pregnancy test. Surgeries that are safe for a fetus don't always need a test, and you can choose whether to have one.   If you have a child who's having surgery, please ask for a copy of Preparing for Your Child's Surgery.    Preparing for surgery    Within 30 days of surgery: Have a pre-op exam (sometimes called an H&P, or History and Physical). This can be done at a clinic or pre-operative center.  ? If you're having a , you may not need this exam. Talk to your care team.    At your pre-op exam, talk to your care team about all medicines you take. If you need to stop any medicines before surgery, ask when to start taking them again.  ? We do this for your safety. Many medicines can make you bleed too much during surgery. Some change how well surgery (anesthesia) drugs work.    Call your insurance company to let them know you're having surgery. (If you don't have insurance, call [phone].)    Call your clinic if there's any change in your health. This includes signs of a cold or flu (sore throat, runny nose, cough, rash, fever). It also includes a scrape or scratch near the surgery site.    If you have questions on the day of surgery, call your hospital or surgery center.  COVID testing  You may need to be tested for COVID-19 before having  surgery. If so, your surgical team will give you instructions for scheduling this test, separate from your preoperative history and physical.  Eating and drinking guidelines  For your safety: Unless your surgeon tells you otherwise, follow the guidelines below.    Eat and drink as usual until 8 hours before surgery. After that, no food or milk.    Drink clear liquids until 2 hours before surgery. These are liquids you can see through, like water, Gatorade and Propel Water. You may also have black coffee and tea (no cream or milk).    Nothing by mouth within 2 hours of surgery. This includes gum, candy and breath mints.    If you drink alcohol: Stop drinking it the night before surgery.    If your care team tells you to take medicine on the morning of surgery, it's okay to take it with a sip of water.  Preventing infection    Shower or bathe the night before and morning of your surgery. Follow the instructions your clinic gave you. (If no instructions, use regular soap.)    Don't shave or clip hair near your surgery site. We'll remove the hair if needed.    Don't smoke or vape the morning of surgery. You may chew nicotine gum up to 2 hours before surgery. A nicotine patch is okay.  ? Note: Some surgeries require you to completely quit smoking and nicotine. Check with your surgeon.    Your care team will make every effort to keep you safe from infection. We will:  ? Clean our hands often with soap and water (or an alcohol-based hand rub).  ? Clean the skin at your surgery site with a special soap that kills germs.  ? Give you a special gown to keep you warm. (Cold raises the risk of infection.)  ? Wear special hair covers, masks, gowns and gloves during surgery.  ? Give antibiotic medicine, if prescribed. Not all surgeries need antibiotics.  What to bring on the day of surgery    Photo ID and insurance card    Copy of your health care directive, if you have one    Glasses and hearing aides (bring cases)  ? You can't  wear contacts during surgery    Inhaler and eye drops, if you use them (tell us about these when you arrive)    CPAP machine or breathing device, if you use them    A few personal items, if spending the night    If you have . . .  ? A pacemaker, ICD (cardiac defibrillator) or other implant: Bring the ID card.  ? An implanted stimulator: Bring the remote control.  ? A legal guardian: Bring a copy of the certified (court-stamped) guardianship papers.  Please remove any jewelry, including body piercings. Leave jewelry and other valuables at home.  If you're going home the day of surgery    You must have a responsible adult drive you home. They should stay with you overnight as well.    If you don't have someone to stay with you, and you aren't safe to go home alone, we may keep you overnight. Insurance often won't pay for this.  After surgery  If it's hard to control your pain or you need more pain medicine, please call your surgeon's office.  Questions?   If you have any questions for your care team, list them here: _________________________________________________________________________________________________________________________________________________________________________ ____________________________________ ____________________________________ ____________________________________  For informational purposes only. Not to replace the advice of your health care provider. Copyright   2003, 2019 [facility]. All rights reserved. Clinically reviewed by Heather Luque MD. [facility] 970207 - REV 07/21.    How to Take Your Medication Before Surgery  - HOLD (do not take) your HYDROCHLOROTHIAZIDE on the morning of surgery.    -Take lisinopril morning of surgery  -If you switch to taking your medications at night you can take both of them and don't need to hold either of them

## 2022-01-20 NOTE — PROGRESS NOTES
[facility] [address] [place] [address]  Phone: [phone]  Fax: [phone]  Primary Provider: Brice Neal  Pre-op Performing Provider: MEGHAN CORTES    PREOPERATIVE EVALUATION:  Today's date: 1/20/2022    Humberto Nova is a 51 year old male who presents for a preoperative evaluation.    Surgical Information:  Surgery/Procedure: tooth implants, extractions, and deep cleaning  Surgery Location: [facility]  Surgeon: Dr. Carter  Surgery Date: 2/25/22  Time of Surgery: TBD  Where patient plans to recover: At home with family  Fax number for surgical facility: Will get fax number to sent chart over    Type of Anesthesia Anticipated: Local with MAC    Assessment & Plan     The proposed surgical procedure is considered LOW risk.    Preop general physical exam  Patient here for pre-op for dental work. Discussed his below chronic health concerns. Well controlled. EKG done within last 90 days so does not need repeat today. Patient approved for surgery.     ELIOT (obstructive sleep apnea)  Has been controlled. Intermittent use with CPAP machine. No issues with anesthesia in the past.    Benign essential hypertension  Has been well controlled. Will refill today as he is in need of refills. Also BMP ordered as it has been over a year.   - hydrochlorothiazide (HYDRODIURIL) 25 MG tablet  Dispense: 90 tablet; Refill: 3  - lisinopril (ZESTRIL) 5 MG tablet  Dispense: 90 tablet; Refill: 3  - Basic metabolic panel     Risks and Recommendations:  The patient has the following additional risks and recommendations for perioperative complications:   - No identified additional risk factors other than previously addressed    Medication Instructions:   - ACE/ARB: May be continued on the day of surgery.    - Diuretics: HOLD on the day of surgery.  -Recommended switching taking medications to night time given studies showing improved benefit with nightly  administration. If he takes them at night he can take both medications however if he continues taking them in the morning recommended holding hydrochlorothiazide. Discussed this with patient and he understood,     RECOMMENDATION:  APPROVAL GIVEN to proceed with proposed procedure, without further diagnostic evaluation.      Subjective     HPI related to upcoming procedure: tooth implants, extractions, cleaning. Patient reports significant anxiety going to the dentist which requires anesthesia for procedures. Had similar procedures in the fall and had no issues with the procedures or anesthesia. Has ELIOT and uses CPAP intermittently.       Preop Questions 1/20/2022   1. Have you ever had a heart attack or stroke? No   2. Have you ever had surgery on your heart or blood vessels, such as a stent placement, a coronary artery bypass, or surgery on an artery in your head, neck, heart, or legs? No   3. Do you have chest pain with activity? No   4. Do you have a history of  heart failure? No   5. Do you currently have a cold, bronchitis or symptoms of other infection? No   6. Do you have a cough, shortness of breath, or wheezing? No   7. Do you or anyone in your family have previous history of blood clots? YES - maternal grandpa   8. Do you or does anyone in your family have a serious bleeding problem such as prolonged bleeding following surgeries or cuts? No   9. Have you ever had problems with anemia or been told to take iron pills? No   10. Have you had any abnormal blood loss such as black, tarry or bloody stools? No   11. Have you ever had a blood transfusion? UNKNOWN -   12. Are you willing to have a blood transfusion if it is medically needed before, during, or after your surgery? Yes   13. Have you or any of your relatives ever had problems with anesthesia? No   14. Do you have sleep apnea, excessive snoring or daytime drowsiness? YES - father and grandfather   14a. Do you have a CPAP machine? Yes   15. Do you have  any artifical heart valves or other implanted medical devices like a pacemaker, defibrillator, or continuous glucose monitor? No   16. Do you have artificial joints? No   17. Are you allergic to latex? No       Health Care Directive:  Patient does not have a Health Care Directive or Living Will: Patient states has Advance Directive and will bring in a copy to clinic.      Review of Systems  CONSTITUTIONAL: NEGATIVE for fever, chills, change in weight  ENT/MOUTH: NEGATIVE for ear, mouth and throat problems  RESP: NEGATIVE for significant cough or SOB  CV: NEGATIVE for chest pain, palpitations or peripheral edema  : negative for dysuria, hematuria, decreased urinary stream, erectile dysfunction  PSYCHIATRIC: NEGATIVE for changes in mood or affect  ROS otherwise negative    Patient Active Problem List    Diagnosis Date Noted     ELIOT (obstructive sleep apnea) 03/18/2021     Priority: Medium     Overweight (BMI 25.0-29.9) 02/01/2019     Priority: Medium     Benign essential hypertension 01/02/2013     Priority: Medium      Past Medical History:   Diagnosis Date     Benign essential hypertension      Past Surgical History:   Procedure Laterality Date     TONSILLECTOMY       Current Outpatient Medications   Medication Sig Dispense Refill     hydrochlorothiazide (HYDRODIURIL) 25 MG tablet Take 1 tablet (25 mg) by mouth daily 90 tablet 3     lisinopril (ZESTRIL) 5 MG tablet Take 1 tablet (5 mg) by mouth daily 90 tablet 3       Allergies   Allergen Reactions     No Known Allergies         Social History     Tobacco Use     Smoking status: Former Smoker     Packs/day: 1.00     Years: 18.00     Pack years: 18.00     Types: Cigarettes     Smokeless tobacco: Former User     Types: Chew     Tobacco comment: roughly quit about 2007-   Substance Use Topics     Alcohol use: No     Alcohol/week: 0.0 standard drinks     Comment: previous etoh abuse quit~ 2005     Family History   Problem Relation Age of Onset     Hypertension Father       Hypertension Maternal Grandfather      Cancer - colorectal No family hx of      Cerebrovascular Disease No family hx of      Diabetes No family hx of      History   Drug Use No     Comment: previous abuse of meth ~ 2005         Objective     /84   Pulse 91   Temp 98.1  F (36.7  C)   Resp 20   Ht 1.829 m (6')   Wt 92.1 kg (203 lb)   SpO2 98%   BMI 27.53 kg/m      Physical Exam    GENERAL APPEARANCE: healthy, alert and no distress     EYES: EOMI,  PERRL     HENT: ear canals and TM's normal and nose and mouth without ulcers or lesions     NECK: no adenopathy, no asymmetry, masses, or scars and thyroid normal to palpation     RESP: lungs clear to auscultation - no rales, rhonchi or wheezes     CV: regular rates and rhythm, normal S1 S2, no S3 or S4 and no murmur, click or rub     ABDOMEN:  soft, nontender, no HSM or masses and bowel sounds normal     MS: extremities normal- no gross deformities noted, no evidence of inflammation in joints, FROM in all extremities.     SKIN: no suspicious lesions or rashes     NEURO: Normal strength and tone, sensory exam grossly normal, mentation intact and speech normal     PSYCH: mentation appears normal. and affect normal/bright     LYMPHATICS: No cervical adenopathy    Recent Labs   Lab Test 01/27/21  1621 02/21/20  0822   .0 142.0   POTASSIUM 4.3 4.2   CR 1.1 1.0   A1C  --  5.1        Diagnostics:  Labs pending at this time.  Results will be reviewed when available.   No EKG this visit, completed in the last 90 days.     Revised Cardiac Risk Index (RCRI):  The patient has the following serious cardiovascular risks for perioperative complications:   - No serious cardiac risks = 0 points     RCRI Interpretation: 0 points: Class I (very low risk - 0.4% complication rate)       Signed Electronically by: Chantell Rizo MD  Copy of this evaluation report is provided to requesting physician.

## 2022-03-12 ENCOUNTER — HEALTH MAINTENANCE LETTER (OUTPATIENT)
Age: 52
End: 2022-03-12

## 2023-04-22 ENCOUNTER — HEALTH MAINTENANCE LETTER (OUTPATIENT)
Age: 53
End: 2023-04-22

## 2024-06-29 ENCOUNTER — HEALTH MAINTENANCE LETTER (OUTPATIENT)
Age: 54
End: 2024-06-29

## 2025-03-20 NOTE — PROGRESS NOTES
Subjective:   Humberto Nova is a 49 year old year old male who presents to clinic today for the following health issues:    Chief Complaint   Patient presents with     Medication Reconciliation     complete     Refill Request     bp med     HTN:  Last seen 1 year ago. History of HTN on hydrochlorothiazide and lisinopril. Continues to have pills, but needs refills. Forgot to take his medications two days last week but felt fine on those days, denied HA, CP. Otherwise feeling well.          PMHX:   PAST MEDICAL HISTORY:  Patient Active Problem List   Diagnosis     Benign essential hypertension     Health Care Home     Overweight (BMI 25.0-29.9)     CURRENT MEDICATIONS:  Current Outpatient Medications   Medication Sig Dispense Refill     hydrochlorothiazide (HYDRODIURIL) 25 MG tablet Take 1 tablet (25 mg) by mouth daily 90 tablet 3     lisinopril (PRINIVIL/ZESTRIL) 5 MG tablet Take 1 tablet (5 mg) by mouth daily 90 tablet 3     ALLERGIES:   No Known Allergies         Objective:     Vitals:    02/21/20 0801   Weight: 90.3 kg (199 lb)     Body mass index is 26.99 kg/m .  No results found for this or any previous visit (from the past 24 hour(s)).    General: Alert, well-appearing male in NAD  Pulm: CTA BL, no tachypnea  CV: RRR, no murmur  Ext: no LE edema   Psych: Mood appropriate to visit content, full affect, rational thought content and process    Assessment and Plan:   1. Benign essential hypertension  BP controlled on current regimen, will continue without change.   - Basic Metabolic Panel ([facility])  - Results < 1 hr  - Lipid Panel ([facility])    2. Overweight (BMI 25.0-29.9)  Body mass index is 26.99 kg/m . Hgb A1c 5.5% in 2017.   - Hemoglobin A1c ([facility])  - Lipid Panel ([facility])    3. Healthcare maintenance  Declined influenza vaccine.   - HIV Ag/Ab Screen [place] [facility]      Options for treatment and follow-up care were reviewed with the patient and/or guardian. Humberto Nova and/or guardian engaged  in the decision making process and verbalized understanding of the options discussed and agreed with the final plan.    Nara Stephen DO  [facility] Resident    Precepted with: Tabatha Eastman MD           Negative